# Patient Record
Sex: MALE | Race: WHITE | Employment: OTHER | URBAN - METROPOLITAN AREA
[De-identification: names, ages, dates, MRNs, and addresses within clinical notes are randomized per-mention and may not be internally consistent; named-entity substitution may affect disease eponyms.]

---

## 2017-03-03 ENCOUNTER — TELEPHONE (OUTPATIENT)
Dept: SURGERY | Facility: CLINIC | Age: 52
End: 2017-03-03

## 2017-03-22 PROBLEM — Z79.52 LONG TERM (CURRENT) USE OF SYSTEMIC STEROIDS: Status: ACTIVE | Noted: 2017-03-22

## 2017-03-22 PROBLEM — Z82.49 FAMILY HISTORY OF CORONARY ARTERY DISEASE IN FATHER: Status: ACTIVE | Noted: 2017-03-22

## 2017-03-22 PROBLEM — E55.9 VITAMIN D INSUFFICIENCY: Status: ACTIVE | Noted: 2017-03-22

## 2017-04-11 ENCOUNTER — TELEPHONE (OUTPATIENT)
Dept: SURGERY | Facility: CLINIC | Age: 52
End: 2017-04-11

## 2017-04-13 ENCOUNTER — OFFICE VISIT (OUTPATIENT)
Dept: SURGERY | Facility: CLINIC | Age: 52
End: 2017-04-13

## 2017-04-13 VITALS
HEART RATE: 84 BPM | HEIGHT: 68 IN | SYSTOLIC BLOOD PRESSURE: 144 MMHG | WEIGHT: 165 LBS | DIASTOLIC BLOOD PRESSURE: 100 MMHG | RESPIRATION RATE: 16 BRPM | BODY MASS INDEX: 25.01 KG/M2

## 2017-04-13 DIAGNOSIS — M48.02 CERVICAL SPINAL STENOSIS: ICD-10-CM

## 2017-04-13 DIAGNOSIS — M47.812 SPONDYLOSIS OF CERVICAL REGION WITHOUT MYELOPATHY OR RADICULOPATHY: Primary | ICD-10-CM

## 2017-04-13 PROCEDURE — 99203 OFFICE O/P NEW LOW 30 MIN: CPT | Performed by: PHYSICIAN ASSISTANT

## 2017-04-13 NOTE — PATIENT INSTRUCTIONS
Refill policies:    • Allow 2 business days for refills; controlled substances may take longer.   • Contact your pharmacy at least 5 days prior to running out of medication and have them send an electronic request or submit request through the “request re insurance carrier to obtain pre-certification or prior authorization. Unfortunately, [employer]UMA has seen an increase in denial of payment even though the procedure/test has been pre-certified.   You are strongly encouraged to contact your insurance carrier to v

## 2017-04-13 NOTE — H&P
[facility] Neurosurgery Consultation      HISTORY OF PRESENT ILLNESS:Max Justice is a 54year old male for cervical consultation. He denies any specific neck pain.   He complains of tightness over the back of the neck is been going on for couple of years (VITAMIN D3) 1000 units Oral Cap Take 1 tablet by mouth daily. Disp: 30 capsule Rfl: 11     No current facility-administered medications on file prior to visit. REVIEW OF SYSTEMS:  A 10-point system was reviewed.   Pertinent positives and negatives are n 5–6, 6–7  2. Cervical stenosis  3. L5 pars defect bilaterally asymptomatic    PLAN:  1. Home exercises were reviewed  2. X-rays and MRI of the cervical spine  3. Follow-up in 6 weeks    Dr Rosetta Cranker evaluated the patient and is in agreement.         Pallavi Mcdonnell

## 2017-04-14 ENCOUNTER — TELEPHONE (OUTPATIENT)
Dept: NEUROLOGY | Facility: CLINIC | Age: 52
End: 2017-04-14

## 2017-04-14 NOTE — TELEPHONE ENCOUNTER
Authorization #[id number] MRI cervical at [facility] exp 5-13-17    Called patient and left detailed message including centralized scheduling phone #

## 2017-04-27 ENCOUNTER — TELEPHONE (OUTPATIENT)
Dept: SURGERY | Facility: CLINIC | Age: 52
End: 2017-04-27

## 2017-04-27 NOTE — TELEPHONE ENCOUNTER
Left detailed message on personalized VM . inform him he should check with his insurance to find out where they recommend he go. We do not have listings of pricing for MRIs.   He is welcome to call different locations for pricing, as long as they are accept

## 2017-04-27 NOTE — TELEPHONE ENCOUNTER
Spoke to pt informed him of message below and asked if he gets imaging elsewhere for him to bring disc at next appointment. Informed him he could also get imaging done at out pt labs.

## 2017-05-02 ENCOUNTER — TELEPHONE (OUTPATIENT)
Dept: SURGERY | Facility: CLINIC | Age: 52
End: 2017-05-02

## 2017-05-03 ENCOUNTER — TELEPHONE (OUTPATIENT)
Dept: SURGERY | Facility: CLINIC | Age: 52
End: 2017-05-03

## 2017-05-03 RX ORDER — DIAZEPAM 10 MG/1
10 TABLET ORAL ONCE
Qty: 1 TABLET | Refills: 0 | Status: SHIPPED | OUTPATIENT
Start: 2017-05-03 | End: 2017-05-03

## 2017-05-03 NOTE — TELEPHONE ENCOUNTER
Valium 10 mg was ordered prior to his MRI for tomorrow.   Please call in to his pharmacy and let patient know thank you

## 2017-05-05 ENCOUNTER — TELEPHONE (OUTPATIENT)
Dept: SURGERY | Facility: CLINIC | Age: 52
End: 2017-05-05

## 2017-05-05 NOTE — TELEPHONE ENCOUNTER
Per Gia Welsh to release report to the patient  He would still like patient to drop off CD for review  Front staff informed.

## 2017-05-25 ENCOUNTER — OFFICE VISIT (OUTPATIENT)
Dept: SURGERY | Facility: CLINIC | Age: 52
End: 2017-05-25

## 2017-05-25 VITALS
BODY MASS INDEX: 25.46 KG/M2 | WEIGHT: 168 LBS | HEIGHT: 68 IN | HEART RATE: 72 BPM | SYSTOLIC BLOOD PRESSURE: 132 MMHG | DIASTOLIC BLOOD PRESSURE: 80 MMHG | RESPIRATION RATE: 12 BRPM

## 2017-05-25 DIAGNOSIS — M54.2 CERVICALGIA: ICD-10-CM

## 2017-05-25 DIAGNOSIS — M47.812 SPONDYLOSIS OF CERVICAL REGION WITHOUT MYELOPATHY OR RADICULOPATHY: Primary | ICD-10-CM

## 2017-05-25 PROCEDURE — 99214 OFFICE O/P EST MOD 30 MIN: CPT | Performed by: PHYSICIAN ASSISTANT

## 2017-05-25 RX ORDER — HYDROCODONE BITARTRATE AND ACETAMINOPHEN 5; 325 MG/1; MG/1
TABLET ORAL
Refills: 0 | COMMUNITY
Start: 2017-04-27 | End: 2019-10-11

## 2017-05-25 NOTE — PROGRESS NOTES
Neurosurgery Clinic Visit  2017    Nunu Sabianism PCP:  Annabelle Rogers MD    1965 MRN [id number]       CC: Neck Stiffness    HPI:    No change in symptoms since the last visit.   He has decreased his prednisone from 15 mg to 10 mg qd by his Rhe about 0.6 oz of alcohol per week. He reports that he does not use illicit drugs.     ALLERGIES:  No Known Allergies    MEDICATIONS:    Current Outpatient Prescriptions on File Prior to Visit:  predniSONE 5 MG Oral Tab  TAKE ONE TABLET BY MOUTH THREE TIMES D           2+        2+         2+        2+              IMAGING:  X-rays of cervical spine from 2015 show spondylosis at C3-4-5-6-7    MRI of the cervical spine from 10/8/13 shows loss of cervical lordosis.  Spondylosis at C3-4, 4–5, 5–6, with C4–5–6 sten

## 2017-05-25 NOTE — PATIENT INSTRUCTIONS
Refill policies:    • Allow 2-3 business days for refills; controlled substances may take longer.   • Contact your pharmacy at least 5 days prior to running out of medication and have them send an electronic request or submit request through the [facility] have a procedure or additional testing performed. [facility]) will contact your insurance carrier to obtain pre-certification or prior authorization.     Unfortunately, FATUMA has seen an increase in denial of payment even though the p

## 2017-05-30 ENCOUNTER — TELEPHONE (OUTPATIENT)
Dept: SURGERY | Facility: CLINIC | Age: 52
End: 2017-05-30

## 2017-05-31 ENCOUNTER — TELEPHONE (OUTPATIENT)
Dept: SURGERY | Facility: CLINIC | Age: 52
End: 2017-05-31

## 2017-07-26 PROBLEM — M50.30 DDD (DEGENERATIVE DISC DISEASE), CERVICAL: Status: ACTIVE | Noted: 2017-07-26

## 2017-07-26 PROBLEM — M54.12 CERVICAL RADICULITIS: Status: ACTIVE | Noted: 2017-07-26

## 2019-10-30 ENCOUNTER — OFFICE VISIT (OUTPATIENT)
Dept: SURGERY | Facility: CLINIC | Age: 54
End: 2019-10-30
Payer: COMMERCIAL

## 2019-10-30 VITALS — DIASTOLIC BLOOD PRESSURE: 82 MMHG | SYSTOLIC BLOOD PRESSURE: 136 MMHG | HEART RATE: 80 BPM
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
## 2019-10-30 DIAGNOSIS — M47.812 CERVICAL SPONDYLOSIS: Primary | ICD-10-CM

## 2019-10-30 DIAGNOSIS — M54.2 CERVICALGIA: ICD-10-CM

## 2019-10-30 PROCEDURE — 99213 OFFICE O/P EST LOW 20 MIN: CPT | Performed by: PHYSICIAN ASSISTANT

## 2019-10-30 NOTE — PROGRESS NOTES
Pt is here for follow up. Pt is an established Pt with Dr Marybeth Kerns. Pt was last seen 5/25/17     Pt states that he has been taking prednisone and doing well.  Pt states that 2 weeks ago he was playing floor hockey he feel on the right side and 2 days after sta

## 2019-10-30 NOTE — PROGRESS NOTES
Patient: [address] [place] [address] Record Number: [id number]  Referring Physician: No ref. provider found  PCP: Nuha García MD      HISTORY OF CHIEF COMPLAINT:    Roland Neves is a 48year old male, who presents for f/u of neck pain.  He was last seen Socioeconomic History      Marital status:       Spouse name: Not on file      Number of children: 2      Years of education: Not on file      Highest education level: Not on file    Occupational History      Occupation: Small business owner    [employer] moderate right and small left uncinate spurs and normal facets resulting in severe  right osseous foraminal stenosis. .  C5-6: Diffuse disc bulge with posterior disc osteophyte complex, moderate bilateral uncinate spurs  and normal facets results in moderat his stated age. blood pressure is 136/82 and his pulse is 80. The patient is well developed, well nourished, normal body habitus, well muscled. Inspection: No acute distress.  Patient displays non-antalgic gait, and is able to normal heel walk, indicates understanding of these issues and agrees to the plan. Thank you very much.    Respectfully yours,    Pastor BISHOP

## 2019-11-08 RX ORDER — DIAZEPAM 5 MG/1
TABLET ORAL
Qty: 2 TABLET | Refills: 0 | Status: ON HOLD | OUTPATIENT
Start: 2019-11-08 | End: 2020-07-07

## 2019-11-08 NOTE — TELEPHONE ENCOUNTER
Spoke with patient and informed him that the closed MRI would provide better imaging. Patient understood and stated he will complete the closed MRI.  Patient is claustrophobic and would like to have oral medication on hand to try and make it through the MRI

## 2019-11-08 NOTE — TELEPHONE ENCOUNTER
Pt asking to have MRI ordered done as open MRI, Scheduling would like provider approval, please call back

## 2019-11-11 ENCOUNTER — TELEPHONE (OUTPATIENT)
Dept: SURGERY | Facility: CLINIC | Age: 54
End: 2019-11-11

## 2019-11-21 ENCOUNTER — OFFICE VISIT (OUTPATIENT)
Dept: SURGERY | Facility: CLINIC | Age: 54
End: 2019-11-21
Payer: COMMERCIAL

## 2019-11-21 VITALS — DIASTOLIC BLOOD PRESSURE: 84 MMHG | SYSTOLIC BLOOD PRESSURE: 136 MMHG | HEART RATE: 82 BPM

## 2019-11-21 DIAGNOSIS — M62.89 MUSCLE TIGHTNESS: ICD-10-CM

## 2019-11-21 DIAGNOSIS — M54.2 CERVICALGIA: Primary | ICD-10-CM

## 2019-11-21 DIAGNOSIS — M47.812 FACET ARTHRITIS OF CERVICAL REGION: ICD-10-CM

## 2019-11-21 DIAGNOSIS — M50.30 DDD (DEGENERATIVE DISC DISEASE), CERVICAL: ICD-10-CM

## 2019-11-21 DIAGNOSIS — M62.838 MUSCLE SPASMS OF NECK: ICD-10-CM

## 2019-11-21 DIAGNOSIS — M48.02 FORAMINAL STENOSIS OF CERVICAL REGION: ICD-10-CM

## 2019-11-21 PROCEDURE — 99213 OFFICE O/P EST LOW 20 MIN: CPT | Performed by: PHYSICIAN ASSISTANT

## 2019-11-21 RX ORDER — CYCLOBENZAPRINE HCL 10 MG
10 TABLET ORAL 3 TIMES DAILY PRN
Qty: 60 TABLET | Refills: 0 | Status: ON HOLD | OUTPATIENT
Start: 2019-11-21 | End: 2020-07-07

## 2019-11-21 RX ORDER — PREDNISONE 10 MG/1
10 TABLET ORAL DAILY
COMMUNITY
End: 2021-05-06

## 2019-11-21 NOTE — PROGRESS NOTES
Pt is here for follow up for discussion for additional treatment,  Pt was last seen in the office 10-30-19       MRI of the cervical: Obtained    Pt states that yesterday he noticed swelling the trap in the left arm, Pt states that he has issues with diffi

## 2019-11-21 NOTE — PROGRESS NOTES
Patient: Jennifer Giron  Medical Record Number: [id number]  YOB: 1965  PCP: Jordan Vizcaino MD    Reason for visit: Cervical follow up, imaging review    HISTORY OF CHIEF COMPLAINT:    Jennifer Giron is a very pleasant 48year old male pres Cervical spinal stenosis     Followed by Dr. Nae Emerson at [facility]   • PONV (postoperative nausea and vomiting)       Past Surgical History:   Procedure Laterality Date   • CARDIAC CALCIUM SCORE  4/7/17    zero   • COLONOSCOPY  5/15    tics; repeat 10 yrs   • COLON apparent distress. Sitting comfortably in the examination chair. HEENT: Normocephalic, atraumatic. RESPIRATORY RATE: Easy and Even   MUSCULOSKELETAL: Reports pain with left shoulder external rotation.    SKIN: Warm and dry  NEURO: Awake, alert and orient appears nearly stable 2/20/2017 imaging. Foraminal narrowing right C 4–5 mildly progressed. Cervical straightening, loss of lordosis. Minimal kyphosis. Spinal stenosis noted   - Ordered today:    - None  3. Activity:    - Physical therapy ordered  4.  R

## 2019-11-22 ENCOUNTER — OFFICE VISIT (OUTPATIENT)
Dept: PHYSICAL MEDICINE AND REHAB | Facility: CLINIC | Age: 54
End: 2019-11-22
Payer: COMMERCIAL

## 2019-11-22 VITALS
DIASTOLIC BLOOD PRESSURE: 80 MMHG | BODY MASS INDEX: 25.01 KG/M2 | HEART RATE: 97 BPM | OXYGEN SATURATION: 98 % | SYSTOLIC BLOOD PRESSURE: 140 MMHG | HEIGHT: 68 IN | WEIGHT: 165 LBS

## 2019-11-22 DIAGNOSIS — M79.18 CERVICAL MYOFASCIAL PAIN SYNDROME: Primary | ICD-10-CM

## 2019-11-22 PROCEDURE — 99244 OFF/OP CNSLTJ NEW/EST MOD 40: CPT | Performed by: PHYSICAL MEDICINE & REHABILITATION

## 2019-11-22 NOTE — H&P
[address] [place] [address]    History and Physical    Alexa Starkey Patient Status:  No patient class for patient encounter    1965 MRN [id number]   Location [address] [place] [address], [address] [place] [address] Attending No att. (postoperative nausea and vomiting)      Past Surgical History:   Procedure Laterality Date   • CARDIAC CALCIUM SCORE  4/7/17    zero   • COLONOSCOPY  5/15    tics; repeat 10 yrs   • COLONOSCOPY, POSSIBLE BIOPSY, POSSIBLE POLYPECTOMY 59434 N/A 5/21/2015 muscle bulk in the bilateral upper extremities. 5/5 strength in shoulder abduction, elbow flexion, elbow extension, wrist extension, finger flexion, ADM bilaterally.     Sensation: intact to LT in C5-T1 dermatomes bilaterally    Reflexes: 2/4 biceps, brachi not improve.     Hoyt Councilman, DO  11/22/2019

## 2019-11-22 NOTE — PROGRESS NOTES
PHYSICAL EXAM:   Physical Exam   Constitutional:           Patient presents in office today with reported pain in neck     Current pain level reported = 8/10    Location of Pain:  Neck     Date Pain Began: 2013          Work Related:   No        Receivin

## 2019-11-24 ENCOUNTER — HOSPITAL ENCOUNTER (EMERGENCY)
Facility: HOSPITAL | Age: 54
Discharge: HOME | End: 2019-11-24
Attending: EMERGENCY MEDICINE
Payer: COMMERCIAL

## 2019-11-24 VITALS
DIASTOLIC BLOOD PRESSURE: 108 MMHG | OXYGEN SATURATION: 98 % | HEART RATE: 94 BPM | TEMPERATURE: 99 F | HEIGHT: 68 IN | BODY MASS INDEX: 25.01 KG/M2 | RESPIRATION RATE: 18 BRPM | SYSTOLIC BLOOD PRESSURE: 153 MMHG | WEIGHT: 165 LBS

## 2019-11-24 DIAGNOSIS — N34.2 URETHRITIS: Primary | ICD-10-CM

## 2019-11-24 PROCEDURE — 87591 N.GONORRHOEAE DNA AMP PROB: CPT | Performed by: EMERGENCY MEDICINE

## 2019-11-24 PROCEDURE — 99283 EMERGENCY DEPT VISIT LOW MDM: CPT

## 2019-11-24 PROCEDURE — 87491 CHLMYD TRACH DNA AMP PROBE: CPT | Performed by: EMERGENCY MEDICINE

## 2019-11-25 NOTE — ED PROVIDER NOTES
Patient Seen in: [facility] Emergency Department      History   Patient presents with:  Eval-G (genital)    Stated Complaint: EVAL G     HPI    Patient is a 72-year-old male comes to ED for evaluation.   Patient states for the last 3 days or so he is (*)     All other components within normal limits   CHLAMYDIA/GONOCOCCUS, NANCY                  MDM     Patient is a 28-year-old male complains of some mild pain to the tip of his penis. No evidence for urinary tract infection. Urine GC was sent.   No evid

## 2019-11-26 ENCOUNTER — MED REC SCAN ONLY (OUTPATIENT)
Dept: PAIN CLINIC | Facility: CLINIC | Age: 54
End: 2019-11-26

## 2019-11-27 ENCOUNTER — TELEPHONE (OUTPATIENT)
Dept: SURGERY | Facility: CLINIC | Age: 54
End: 2019-11-27

## 2019-12-06 ENCOUNTER — OFFICE VISIT (OUTPATIENT)
Dept: FAMILY MEDICINE CLINIC | Facility: CLINIC | Age: 54
End: 2019-12-06
Payer: COMMERCIAL

## 2019-12-06 VITALS
OXYGEN SATURATION: 99 % | DIASTOLIC BLOOD PRESSURE: 100 MMHG | HEART RATE: 95 BPM | SYSTOLIC BLOOD PRESSURE: 140 MMHG | RESPIRATION RATE: 16 BRPM | HEIGHT: 68 IN | BODY MASS INDEX: 25.52 KG/M2 | TEMPERATURE: 98 F | WEIGHT: 168.38 LBS

## 2019-12-06 DIAGNOSIS — R42 VERTIGO: Primary | ICD-10-CM

## 2019-12-06 PROCEDURE — 99213 OFFICE O/P EST LOW 20 MIN: CPT | Performed by: FAMILY MEDICINE

## 2019-12-06 RX ORDER — MECLIZINE HYDROCHLORIDE 25 MG/1
25 TABLET ORAL 3 TIMES DAILY PRN
Qty: 60 TABLET | Refills: 0 | Status: ON HOLD | OUTPATIENT
Start: 2019-12-06 | End: 2020-07-07

## 2019-12-07 NOTE — PROGRESS NOTES
CHIEF COMPLAINT:     Patient presents with:  Dizziness: dizzy, x 2 days   :      HPI:     Anna Marie is a 48year old male presents with complaints of dizziness. Patient has had symptoms for 2 days. Patient has symptoms like this before.  Pt has hx o Smoking status: Never Smoker      Smokeless tobacco: Never Used    Alcohol use:  Yes      Alcohol/week: 1.0 standard drinks      Types: 1 Standard drinks or equivalent per week      Comment: 1 glass of wine/weekly/Special Events    Drug use: No       REV clubbing or edema  LYMPH: No cervical or supraclavicular lymphadenopathy. PSYCH:  Speech, mood and affect are appropriate.      ASSESSMENT AND PLAN:     Vertigo  (primary encounter diagnosis)    No orders of the defined types were placed in this encounter

## 2019-12-07 NOTE — PATIENT INSTRUCTIONS
Take meclizine-- 1 tab every 8 hours as needed. Rest, increase fluids and avoid inverted head movements as much as possible.    Contact your chiropractor tomorrow to discuss your symptoms to make sure he doesn't have any other concerns that need to be add

## 2019-12-11 ENCOUNTER — TELEPHONE (OUTPATIENT)
Dept: PHYSICAL THERAPY | Facility: HOSPITAL | Age: 54
End: 2019-12-11

## 2019-12-17 ENCOUNTER — HOSPITAL ENCOUNTER (OUTPATIENT)
Dept: PHYSICAL THERAPY | Facility: HOSPITAL | Age: 54
Setting detail: THERAPIES SERIES
Discharge: HOME | End: 2019-12-17
Attending: PHYSICAL MEDICINE & REHABILITATION
Payer: COMMERCIAL

## 2019-12-17 DIAGNOSIS — M79.18 CERVICAL MYOFASCIAL PAIN SYNDROME: ICD-10-CM

## 2019-12-17 PROCEDURE — 97110 THERAPEUTIC EXERCISES: CPT

## 2019-12-17 PROCEDURE — 97161 PT EVAL LOW COMPLEX 20 MIN: CPT

## 2019-12-18 NOTE — PROGRESS NOTES
SPINE EVALUATION:   Referring Physician: Dr. Nitin Marley  Diagnosis: cervical myofascial pain syndrome     Date of Service: 12/17/2019     PATIENT SUMMARY   Fausto Carey is a 48year old RHD male who presents to therapy today with complaints of bilateral Cervical spinal stenosis     Followed by Dr. Donny Adams at [facility]   • PONV (postoperative nausea and vomiting)      Pt denies diplopia, dysarthria, dysphasia, dizziness, drop attacks, bowel/bladder changes, saddle anesthesia, and GREG LE N/T.    ASSESSMENT  Silke Zavala limited; L moderately limited  Levator Scap: R moderately limited; L moderately limited  Pec Major: R moderately limited; L moderately limited  Scalenes: R moderately limited, L moderately limited     Special tests:   Cervical distraction positive, emerita the need for these services furnished under this plan of treatment and while under my care.     X___________________________________________________ Date____________________    Certification From: 82/05/0587  To:3/17/2020

## 2019-12-20 ENCOUNTER — OFFICE VISIT (OUTPATIENT)
Dept: PHYSICAL MEDICINE AND REHAB | Facility: CLINIC | Age: 54
End: 2019-12-20
Payer: COMMERCIAL

## 2019-12-20 VITALS
DIASTOLIC BLOOD PRESSURE: 80 MMHG | HEART RATE: 87 BPM | OXYGEN SATURATION: 98 % | SYSTOLIC BLOOD PRESSURE: 120 MMHG | WEIGHT: 165 LBS | BODY MASS INDEX: 25.01 KG/M2 | HEIGHT: 68 IN

## 2019-12-20 DIAGNOSIS — M79.18 CERVICAL MYOFASCIAL PAIN SYNDROME: Primary | ICD-10-CM

## 2019-12-20 PROCEDURE — 99214 OFFICE O/P EST MOD 30 MIN: CPT | Performed by: PHYSICAL MEDICINE & REHABILITATION

## 2019-12-20 NOTE — PROGRESS NOTES
HPI:    Patient ID: Andreea Hernández is a 48year old male. 48year old male presents for follow up. Symptoms improved since the last time I saw him, but still present.  Was able to do one session with physical therapy, and has more therapy scheduled sta Neurological:   Strength testin/5  bilaterally    Sensation: intact to LT in C5-T1 dermatomes bilaterally    Reflexes: 2/4 biceps, brachioradialis, triceps bilaterally    Correia test: normal bilaterally   Skin: Skin is warm and dry. No rash noted.

## 2019-12-20 NOTE — PATIENT INSTRUCTIONS
If you are not improving call the office and we will get you set up for trigger point injections. In the meantime continue your exercises and I will hear from your therapist when the next 4 sessions are through.      Refill policies:    • Allow 2-3 business MONDAY-FRIDAY    Scheduling Tests: If your physician has ordered radiology tests such as MRI or CT scans, do not schedule the test until this office has notified you that the test has been approved by your insurer.   Depending on your insurance carrier,  for this paper. • Failure to follow above steps may result in the delay of form completion.

## 2019-12-20 NOTE — PROGRESS NOTES
Patient presents in office today with reported spasms in the neck to the shoulders tightness     Physical therapy one session     Chiropractor helps for a little more then 50% for a day

## 2019-12-27 ENCOUNTER — HOSPITAL ENCOUNTER (OUTPATIENT)
Dept: PHYSICAL THERAPY | Facility: HOSPITAL | Age: 54
Setting detail: THERAPIES SERIES
Discharge: HOME | End: 2019-12-27
Attending: PHYSICAL MEDICINE & REHABILITATION
Payer: COMMERCIAL

## 2019-12-27 DIAGNOSIS — M79.18 CERVICAL MYOFASCIAL PAIN SYNDROME: ICD-10-CM

## 2019-12-27 PROCEDURE — 97110 THERAPEUTIC EXERCISES: CPT

## 2019-12-27 PROCEDURE — 97140 MANUAL THERAPY 1/> REGIONS: CPT

## 2019-12-28 NOTE — PROGRESS NOTES
Dx: cervical myofascial pain syndrome             Authorized # of Visits:  8 per POC         Next MD visit: none scheduled  Fall Risk: standard         Precautions: n/a             Subjective: Patient reports that he has been doing his HEP, which is gettin

## 2019-12-30 ENCOUNTER — HOSPITAL ENCOUNTER (OUTPATIENT)
Dept: PHYSICAL THERAPY | Facility: HOSPITAL | Age: 54
Setting detail: THERAPIES SERIES
Discharge: HOME | End: 2019-12-30
Attending: PHYSICAL MEDICINE & REHABILITATION
Payer: COMMERCIAL

## 2019-12-30 DIAGNOSIS — M79.18 CERVICAL MYOFASCIAL PAIN SYNDROME: ICD-10-CM

## 2019-12-30 PROCEDURE — 97110 THERAPEUTIC EXERCISES: CPT

## 2019-12-30 PROCEDURE — 97140 MANUAL THERAPY 1/> REGIONS: CPT

## 2019-12-30 NOTE — PROGRESS NOTES
Dx: cervical myofascial pain syndrome             Authorized # of Visits:  8 per POC         Next MD visit: none scheduled  Fall Risk: standard         Precautions: n/a             Subjective: Patient reports that HEP is getting easier.  He did snag mobiliz

## 2020-01-03 ENCOUNTER — APPOINTMENT (OUTPATIENT)
Dept: PHYSICAL THERAPY | Facility: HOSPITAL | Age: 55
End: 2020-01-03
Attending: PHYSICAL MEDICINE & REHABILITATION
Payer: COMMERCIAL

## 2020-02-27 ENCOUNTER — TELEPHONE (OUTPATIENT)
Dept: SURGERY | Facility: CLINIC | Age: 55
End: 2020-02-27

## 2020-02-27 NOTE — TELEPHONE ENCOUNTER
Pt calling stating CT Cervical states \"severe disk space collapse C2-C7 greates @ C5 & C6\", however MRI Cervical does not have this verbage; pt asking if MRI may have missed something, please call back

## 2020-03-09 ENCOUNTER — TELEPHONE (OUTPATIENT)
Dept: NEUROLOGY | Facility: CLINIC | Age: 55
End: 2020-03-09

## 2020-03-09 ENCOUNTER — TELEPHONE (OUTPATIENT)
Dept: SURGERY | Facility: CLINIC | Age: 55
End: 2020-03-09

## 2020-03-09 DIAGNOSIS — M79.18 CERVICAL MYOFASCIAL PAIN SYNDROME: Primary | ICD-10-CM

## 2020-03-09 NOTE — TELEPHONE ENCOUNTER
Patient called asking for Trigger point injections to be done in [place]. Dr. Anne Sandhoff please order trigger point injection for [place]. Thank you

## 2020-03-09 NOTE — TELEPHONE ENCOUNTER
Spoke to patient waiting for Dr. Malgorzata Cadena to order Trigger point injection and then we will schedule injection in [place] [address] office.

## 2020-03-11 ENCOUNTER — TELEPHONE (OUTPATIENT)
Dept: NEUROLOGY | Facility: CLINIC | Age: 55
End: 2020-03-11

## 2020-03-11 NOTE — TELEPHONE ENCOUNTER
[employer] for authorization of approval for Bilateral cervical and upper trapezial trigger point injections  cpt codes 41711, 22905. Authorization is not required. Transaction ID: [id number]. Will  Inform Nursing.

## 2020-03-16 ENCOUNTER — OFFICE VISIT (OUTPATIENT)
Dept: NEUROLOGY | Facility: CLINIC | Age: 55
End: 2020-03-16
Payer: COMMERCIAL

## 2020-03-16 ENCOUNTER — MED REC SCAN ONLY (OUTPATIENT)
Dept: NEUROLOGY | Facility: CLINIC | Age: 55
End: 2020-03-16

## 2020-03-16 VITALS — BODY MASS INDEX: 25.01 KG/M2 | RESPIRATION RATE: 16 BRPM | HEIGHT: 68 IN | WEIGHT: 165 LBS

## 2020-03-16 DIAGNOSIS — M79.18 CERVICAL MYOFASCIAL PAIN SYNDROME: Primary | ICD-10-CM

## 2020-03-16 PROCEDURE — 20553 NJX 1/MLT TRIGGER POINTS 3/>: CPT | Performed by: PHYSICAL MEDICINE & REHABILITATION

## 2020-03-16 RX ORDER — LIDOCAINE HYDROCHLORIDE 10 MG/ML
5 INJECTION, SOLUTION INFILTRATION; PERINEURAL ONCE
Status: COMPLETED | OUTPATIENT
Start: 2020-03-16 | End: 2020-03-16

## 2020-03-16 NOTE — PROCEDURES
Procedure note: Trigger point injections  Procedure Diagnosis: Myofascial pain    Summary of Procedure:   Risks and benefits of the procedure were discussed with the patient and consent was obtained.  Trigger points were palpated and marked along the bilate

## 2020-06-03 PROBLEM — M47.899 HLA-B27 SPONDYLOARTHROPATHY: Status: ACTIVE | Noted: 2020-06-03

## 2020-06-03 PROBLEM — R03.0 ELEVATED BLOOD PRESSURE READING WITHOUT DIAGNOSIS OF HYPERTENSION: Status: ACTIVE | Noted: 2020-06-03

## 2020-07-06 ENCOUNTER — HOSPITAL ENCOUNTER (INPATIENT)
Facility: HOSPITAL | Age: 55
LOS: 1 days | Discharge: HOME | DRG: 392 | End: 2020-07-08
Attending: EMERGENCY MEDICINE | Admitting: INTERNAL MEDICINE
Payer: COMMERCIAL

## 2020-07-06 ENCOUNTER — APPOINTMENT (OUTPATIENT)
Dept: CT IMAGING | Facility: HOSPITAL | Age: 55
DRG: 392 | End: 2020-07-06
Attending: EMERGENCY MEDICINE
Payer: COMMERCIAL

## 2020-07-06 ENCOUNTER — APPOINTMENT (OUTPATIENT)
Dept: GENERAL RADIOLOGY | Facility: HOSPITAL | Age: 55
DRG: 392 | End: 2020-07-06
Attending: EMERGENCY MEDICINE
Payer: COMMERCIAL

## 2020-07-06 DIAGNOSIS — R50.9 FEVER, UNSPECIFIED FEVER CAUSE: Primary | ICD-10-CM

## 2020-07-06 DIAGNOSIS — K57.92 ACUTE DIVERTICULITIS: ICD-10-CM

## 2020-07-06 PROBLEM — E87.6 HYPOKALEMIA: Status: ACTIVE | Noted: 2020-07-06

## 2020-07-06 PROBLEM — R73.9 HYPERGLYCEMIA: Status: ACTIVE | Noted: 2020-07-06

## 2020-07-06 LAB
ALBUMIN SERPL-MCNC: 3.3 G/DL (ref 3.4–5)
ALBUMIN/GLOB SERPL: 1 {RATIO} (ref 1–2)
ALP LIVER SERPL-CCNC: 60 U/L (ref 45–117)
ALT SERPL-CCNC: 26 U/L (ref 16–61)
ANION GAP SERPL CALC-SCNC: 2 MMOL/L (ref 0–18)
AST SERPL-CCNC: 16 U/L (ref 15–37)
BASOPHILS # BLD AUTO: 0.03 X10(3) UL (ref 0–0.2)
BASOPHILS NFR BLD AUTO: 0.4 %
BILIRUB SERPL-MCNC: 0.7 MG/DL (ref 0.1–2)
BILIRUB UR QL STRIP.AUTO: NEGATIVE
BUN BLD-MCNC: 19 MG/DL (ref 7–18)
BUN/CREAT SERPL: 15.1 (ref 10–20)
CALCIUM BLD-MCNC: 8.5 MG/DL (ref 8.5–10.1)
CHLORIDE SERPL-SCNC: 106 MMOL/L (ref 98–112)
CK SERPL-CCNC: 52 U/L (ref 39–308)
CLARITY UR REFRACT.AUTO: CLEAR
CO2 SERPL-SCNC: 30 MMOL/L (ref 21–32)
CREAT BLD-MCNC: 1.26 MG/DL (ref 0.7–1.3)
DEPRECATED RDW RBC AUTO: 41.2 FL (ref 35.1–46.3)
EOSINOPHIL # BLD AUTO: 0 X10(3) UL (ref 0–0.7)
EOSINOPHIL NFR BLD AUTO: 0 %
ERYTHROCYTE [DISTWIDTH] IN BLOOD BY AUTOMATED COUNT: 11.9 % (ref 11–15)
GLOBULIN PLAS-MCNC: 3.2 G/DL (ref 2.8–4.4)
GLUCOSE BLD-MCNC: 107 MG/DL (ref 70–99)
GLUCOSE UR STRIP.AUTO-MCNC: NEGATIVE MG/DL
HCT VFR BLD AUTO: 42.6 % (ref 39–53)
HGB BLD-MCNC: 13.8 G/DL (ref 13–17.5)
IMM GRANULOCYTES # BLD AUTO: 0.07 X10(3) UL (ref 0–1)
IMM GRANULOCYTES NFR BLD: 1 %
KETONES UR STRIP.AUTO-MCNC: 20 MG/DL
LEUKOCYTE ESTERASE UR QL STRIP.AUTO: NEGATIVE
LYMPHOCYTES # BLD AUTO: 0.69 X10(3) UL (ref 1–4)
LYMPHOCYTES NFR BLD AUTO: 10 %
M PROTEIN MFR SERPL ELPH: 6.5 G/DL (ref 6.4–8.2)
MCH RBC QN AUTO: 30.7 PG (ref 26–34)
MCHC RBC AUTO-ENTMCNC: 32.4 G/DL (ref 31–37)
MCV RBC AUTO: 94.7 FL (ref 80–100)
MONOCYTES # BLD AUTO: 0.5 X10(3) UL (ref 0.1–1)
MONOCYTES NFR BLD AUTO: 7.2 %
NEUTROPHILS # BLD AUTO: 5.61 X10 (3) UL (ref 1.5–7.7)
NEUTROPHILS # BLD AUTO: 5.61 X10(3) UL (ref 1.5–7.7)
NEUTROPHILS NFR BLD AUTO: 81.4 %
NITRITE UR QL STRIP.AUTO: NEGATIVE
NT-PROBNP SERPL-MCNC: 122 PG/ML (ref ?–125)
OSMOLALITY SERPL CALC.SUM OF ELEC: 289 MOSM/KG (ref 275–295)
PH UR STRIP.AUTO: 7 [PH] (ref 4.5–8)
PLATELET # BLD AUTO: 200 10(3)UL (ref 150–450)
POTASSIUM SERPL-SCNC: 3.5 MMOL/L (ref 3.5–5.1)
PROT UR STRIP.AUTO-MCNC: NEGATIVE MG/DL
RBC # BLD AUTO: 4.5 X10(6)UL (ref 4.3–5.7)
SARS-COV-2 RNA RESP QL NAA+PROBE: NOT DETECTED
SODIUM SERPL-SCNC: 138 MMOL/L (ref 136–145)
SP GR UR STRIP.AUTO: 1.03 (ref 1–1.03)
UROBILINOGEN UR STRIP.AUTO-MCNC: <2 MG/DL
WBC # BLD AUTO: 6.9 X10(3) UL (ref 4–11)

## 2020-07-06 PROCEDURE — 81001 URINALYSIS AUTO W/SCOPE: CPT | Performed by: EMERGENCY MEDICINE

## 2020-07-06 PROCEDURE — 71045 X-RAY EXAM CHEST 1 VIEW: CPT | Performed by: EMERGENCY MEDICINE

## 2020-07-06 PROCEDURE — 96365 THER/PROPH/DIAG IV INF INIT: CPT

## 2020-07-06 PROCEDURE — 82550 ASSAY OF CK (CPK): CPT | Performed by: HOSPITALIST

## 2020-07-06 PROCEDURE — 85025 COMPLETE CBC W/AUTO DIFF WBC: CPT | Performed by: EMERGENCY MEDICINE

## 2020-07-06 PROCEDURE — 87040 BLOOD CULTURE FOR BACTERIA: CPT | Performed by: EMERGENCY MEDICINE

## 2020-07-06 PROCEDURE — 36415 COLL VENOUS BLD VENIPUNCTURE: CPT

## 2020-07-06 PROCEDURE — 74177 CT ABD & PELVIS W/CONTRAST: CPT | Performed by: EMERGENCY MEDICINE

## 2020-07-06 PROCEDURE — 83880 ASSAY OF NATRIURETIC PEPTIDE: CPT | Performed by: HOSPITALIST

## 2020-07-06 PROCEDURE — 84145 PROCALCITONIN (PCT): CPT | Performed by: HOSPITALIST

## 2020-07-06 PROCEDURE — 80053 COMPREHEN METABOLIC PANEL: CPT | Performed by: EMERGENCY MEDICINE

## 2020-07-06 PROCEDURE — 96361 HYDRATE IV INFUSION ADD-ON: CPT

## 2020-07-06 PROCEDURE — 99285 EMERGENCY DEPT VISIT HI MDM: CPT

## 2020-07-06 PROCEDURE — 96375 TX/PRO/DX INJ NEW DRUG ADDON: CPT

## 2020-07-06 RX ORDER — METRONIDAZOLE 500 MG/100ML
500 INJECTION, SOLUTION INTRAVENOUS EVERY 8 HOURS
Status: DISCONTINUED | OUTPATIENT
Start: 2020-07-07 | End: 2020-07-08

## 2020-07-06 RX ORDER — ACETAMINOPHEN 325 MG/1
650 TABLET ORAL EVERY 6 HOURS PRN
Status: DISCONTINUED | OUTPATIENT
Start: 2020-07-06 | End: 2020-07-08

## 2020-07-06 RX ORDER — ENOXAPARIN SODIUM 100 MG/ML
40 INJECTION SUBCUTANEOUS DAILY
Status: DISCONTINUED | OUTPATIENT
Start: 2020-07-07 | End: 2020-07-08

## 2020-07-06 RX ORDER — ACETAMINOPHEN 500 MG
1000 TABLET ORAL ONCE
Status: COMPLETED | OUTPATIENT
Start: 2020-07-06 | End: 2020-07-06

## 2020-07-06 RX ORDER — ONDANSETRON 2 MG/ML
4 INJECTION INTRAMUSCULAR; INTRAVENOUS ONCE
Status: COMPLETED | OUTPATIENT
Start: 2020-07-06 | End: 2020-07-06

## 2020-07-06 RX ORDER — METRONIDAZOLE 500 MG/100ML
500 INJECTION, SOLUTION INTRAVENOUS ONCE
Status: COMPLETED | OUTPATIENT
Start: 2020-07-06 | End: 2020-07-07

## 2020-07-06 RX ORDER — METOCLOPRAMIDE HYDROCHLORIDE 5 MG/ML
10 INJECTION INTRAMUSCULAR; INTRAVENOUS EVERY 8 HOURS PRN
Status: DISCONTINUED | OUTPATIENT
Start: 2020-07-06 | End: 2020-07-08

## 2020-07-06 RX ORDER — ONDANSETRON 2 MG/ML
4 INJECTION INTRAMUSCULAR; INTRAVENOUS EVERY 6 HOURS PRN
Status: DISCONTINUED | OUTPATIENT
Start: 2020-07-06 | End: 2020-07-08

## 2020-07-06 RX ORDER — SODIUM CHLORIDE 9 MG/ML
INJECTION, SOLUTION INTRAVENOUS CONTINUOUS
Status: DISCONTINUED | OUTPATIENT
Start: 2020-07-06 | End: 2020-07-08

## 2020-07-06 NOTE — ED PROVIDER NOTES
Patient Seen in: [facility] Emergency Department      History   Patient presents with:  Fever  Nausea/Vomiting/Diarrhea    Stated Complaint: fever N&V - 102. 6F for ems    HPI  This is 53-year-old man denies any significant medical history, here with noted in HPI. Constitutional and vital signs reviewed. All other systems reviewed and negative except as noted above.     Physical Exam     ED Triage Vitals   BP 07/06/20 1945 (!) 149/92   Pulse 07/06/20 1945 98   Resp 07/06/20 1945 20   Temp 07/06/20 CREATININE) - Normal   RAPID SARS-COV-2 BY PCR - Normal   CBC WITH DIFFERENTIAL WITH PLATELET    Narrative: The following orders were created for panel order CBC WITH DIFFERENTIAL WITH PLATELET.   Procedure                               Abnormality ACR (American College of Radiology) NRDR ([address]) which includes the Dose Index Registry.   PATIENT STATED HISTORY:(As transcribed by Technologist)  llq abdominal pain, fever and n/v x 24 hours   CONTRAST USED:  90cc of Omnipaque 3 with fever, diffuse myalgias. On exam he has mild left lower quadrant tenderness, differential includes COVID-19 infection, pneumonia, diverticulitis, gastroenteritis. Will check basic labs give IV fluids antiemetics reevaluate.   Admission disposition: 7

## 2020-07-06 NOTE — ED INITIAL ASSESSMENT (HPI)
Patient presents via EMS for c/o fever and N&V for about 24 hours. Patient's temp for EMS was 102.6F Took Advil at about 0930 this morning. Received 4mg zofran via EMS and nausea has improved.

## 2020-07-07 LAB
ANION GAP SERPL CALC-SCNC: 6 MMOL/L (ref 0–18)
BASOPHILS # BLD AUTO: 0.04 X10(3) UL (ref 0–0.2)
BASOPHILS NFR BLD AUTO: 0.6 %
BUN BLD-MCNC: 13 MG/DL (ref 7–18)
BUN/CREAT SERPL: 13.4 (ref 10–20)
CALCIUM BLD-MCNC: 8.4 MG/DL (ref 8.5–10.1)
CHLORIDE SERPL-SCNC: 105 MMOL/L (ref 98–112)
CO2 SERPL-SCNC: 25 MMOL/L (ref 21–32)
CREAT BLD-MCNC: 0.97 MG/DL (ref 0.7–1.3)
CRP SERPL-MCNC: 7.79 MG/DL (ref ?–0.3)
D-DIMER: 1.28 UG/ML FEU (ref ?–0.53)
DEPRECATED HBV CORE AB SER IA-ACNC: 360.1 NG/ML (ref 30–530)
DEPRECATED RDW RBC AUTO: 41.8 FL (ref 35.1–46.3)
EOSINOPHIL # BLD AUTO: 0 X10(3) UL (ref 0–0.7)
EOSINOPHIL NFR BLD AUTO: 0 %
ERYTHROCYTE [DISTWIDTH] IN BLOOD BY AUTOMATED COUNT: 11.9 % (ref 11–15)
GLUCOSE BLD-MCNC: 66 MG/DL (ref 70–99)
GLUCOSE BLD-MCNC: 93 MG/DL (ref 70–99)
HCT VFR BLD AUTO: 42.6 % (ref 39–53)
HGB BLD-MCNC: 13.9 G/DL (ref 13–17.5)
IMM GRANULOCYTES # BLD AUTO: 0.08 X10(3) UL (ref 0–1)
IMM GRANULOCYTES NFR BLD: 1.2 %
LDH SERPL L TO P-CCNC: 202 U/L
LYMPHOCYTES # BLD AUTO: 0.64 X10(3) UL (ref 1–4)
LYMPHOCYTES NFR BLD AUTO: 9.7 %
MCH RBC QN AUTO: 31.3 PG (ref 26–34)
MCHC RBC AUTO-ENTMCNC: 32.6 G/DL (ref 31–37)
MCV RBC AUTO: 95.9 FL (ref 80–100)
MONOCYTES # BLD AUTO: 0.47 X10(3) UL (ref 0.1–1)
MONOCYTES NFR BLD AUTO: 7.1 %
NEUTROPHILS # BLD AUTO: 5.36 X10 (3) UL (ref 1.5–7.7)
NEUTROPHILS # BLD AUTO: 5.36 X10(3) UL (ref 1.5–7.7)
NEUTROPHILS NFR BLD AUTO: 81.4 %
OSMOLALITY SERPL CALC.SUM OF ELEC: 280 MOSM/KG (ref 275–295)
PLATELET # BLD AUTO: 186 10(3)UL (ref 150–450)
POTASSIUM SERPL-SCNC: 3.2 MMOL/L (ref 3.5–5.1)
POTASSIUM SERPL-SCNC: 4.1 MMOL/L (ref 3.5–5.1)
PROCALCITONIN SERPL-MCNC: 0.39 NG/ML (ref ?–0.16)
RBC # BLD AUTO: 4.44 X10(6)UL (ref 4.3–5.7)
SODIUM SERPL-SCNC: 136 MMOL/L (ref 136–145)
WBC # BLD AUTO: 6.6 X10(3) UL (ref 4–11)

## 2020-07-07 PROCEDURE — 85025 COMPLETE CBC W/AUTO DIFF WBC: CPT | Performed by: HOSPITALIST

## 2020-07-07 PROCEDURE — 80048 BASIC METABOLIC PNL TOTAL CA: CPT | Performed by: HOSPITALIST

## 2020-07-07 PROCEDURE — 83615 LACTATE (LD) (LDH) ENZYME: CPT | Performed by: HOSPITALIST

## 2020-07-07 PROCEDURE — 84132 ASSAY OF SERUM POTASSIUM: CPT | Performed by: INTERNAL MEDICINE

## 2020-07-07 PROCEDURE — 82962 GLUCOSE BLOOD TEST: CPT

## 2020-07-07 PROCEDURE — 86140 C-REACTIVE PROTEIN: CPT | Performed by: HOSPITALIST

## 2020-07-07 PROCEDURE — 85379 FIBRIN DEGRADATION QUANT: CPT | Performed by: HOSPITALIST

## 2020-07-07 PROCEDURE — 82728 ASSAY OF FERRITIN: CPT | Performed by: HOSPITALIST

## 2020-07-07 RX ORDER — PREDNISONE 10 MG/1
10 TABLET ORAL
Status: DISCONTINUED | OUTPATIENT
Start: 2020-07-07 | End: 2020-07-08

## 2020-07-07 RX ORDER — POTASSIUM CHLORIDE 14.9 MG/ML
20 INJECTION INTRAVENOUS ONCE
Status: COMPLETED | OUTPATIENT
Start: 2020-07-07 | End: 2020-07-07

## 2020-07-07 RX ORDER — PANTOPRAZOLE SODIUM 20 MG/1
20 TABLET, DELAYED RELEASE ORAL
Status: DISCONTINUED | OUTPATIENT
Start: 2020-07-07 | End: 2020-07-08

## 2020-07-07 RX ORDER — LEVOFLOXACIN 5 MG/ML
750 INJECTION, SOLUTION INTRAVENOUS EVERY 24 HOURS
Status: DISCONTINUED | OUTPATIENT
Start: 2020-07-07 | End: 2020-07-08

## 2020-07-07 RX ORDER — ZOLPIDEM TARTRATE 5 MG/1
2.5 TABLET ORAL NIGHTLY
Status: DISCONTINUED | OUTPATIENT
Start: 2020-07-07 | End: 2020-07-08

## 2020-07-07 NOTE — H&P
[facility] Hospitalist History and Physical      Patient presents with:  Fever  Nausea/Vomiting/Diarrhea       PCP: Giuseppe Molina MD      History of Present Illness: Patient is a 50year old male with PMH sig for arthritis with HLA-B27 spondyloarthropathy who pre Disp: , Rfl: 0  HYDROcodone-acetaminophen 5-325 MG Oral Tab, Take 1 tablet by mouth as needed. , Disp: , Rfl:   Azelastine HCl 0.1 % Nasal Solution, 2 sprays by Nasal route 2 (two) times daily. , Disp: 1 Bottle, Rfl: 0  Albuterol Sulfate  (90 Base) MC 07/07/2020     07/07/2020    K 3.2 07/07/2020     07/07/2020    CO2 25.0 07/07/2020    GLU 66 07/07/2020    CA 8.4 07/07/2020    ALB 3.3 07/06/2020    ALKPHO 60 07/06/2020    BILT 0.7 07/06/2020    TP 6.5 07/06/2020    AST 16 07/06/2020    ALT abnormal density, or significant focal lesion. BILIARY:  No visible dilatation or calcification. PANCREAS:  No lesion, fluid collection, ductal dilatation, or atrophy. SPLEEN:  No enlargement or focal lesion.   KIDNEYS:  Possible para pelvic cysts of the evaluation   - cont empiric rocephin and flagyl   - cont NS at 100 cc/hr  - f/u BCx's  - COVID PCR is pending, will keep in contact/dropolet isolation until this test is back     #HLA-B27 spondyloarthropathy   - resume prednisone     DVT prophy - lov SC  D

## 2020-07-07 NOTE — PAYOR COMM NOTE
--------------  ADMISSION REVIEW     Payor: LISA COFFEY  Subscriber #:  [id number]  Authorization Number: [id number]    Admit date: 7/7/20  Admit time: 1173     Admit Orders (From admission, onward)     Start     Ordered    07/07/20 1323  Admit to inpatient rales.   Abdominal: Soft, mild left lower quadrant tenderness, no guarding no rebound tenderness  Skin: Warm and dry  Neurological: Awake alert, speech is normal    Labs Reviewed   COMP METABOLIC PANEL (14) - Abnormal; Notable for the following components: The patient reports waking up with chills and vague abdominal discomfort, one episode of dry heaves but no vomiting. No cough, SOB, URI symptoms, or diarrhea. Pt reports having people over at the house yesterday, sister reported feeling unwell.   No know 50 yr old male with PMH sig for arthritis with HLA-B27 spondyloarthropathy who presented to the ED last night c/o fevers, myalgias, and abd pain.     #Fevers, myalgias, and abd pain  #Probable acute sigmoid diverticulitis   - suspect the source of fever RN      ondansetron HCl (ZOFRAN) injection 4 mg     Date Action Dose Route User    7/7/2020 1010 Given 4 mg Intravenous Liv Tovar RN    7/7/2020 0354 Given 4 mg Intravenous Brandi Correa RN      potassium chloride 40 mEq in sodium chloride 0.9% 250 m

## 2020-07-07 NOTE — PLAN OF CARE
Problem: Patient/Family Goals  Goal: Patient/Family Long Term Goal  Description  Patient's Long Term Goal: Discharge with adequate resources    Interventions:  - Participate in and comply with medical treatment plan  - See additional Care Plan goals for

## 2020-07-07 NOTE — CONSULTS
[facility]  Report of Consultation    Jimmikal Billingsley Patient Status:  Observation    1965 MRN [id number]   [facility] 5NW-A Attending Noé Lira MD   [facility] Day # 0 PCP Mary Martinez MD     20     Reason for Consulta Facility-Administered Medications:   •  potassium chloride 40 mEq in sodium chloride 0.9% 250 mL IVPB, 40 mEq, Intravenous, Once **FOLLOWED BY** potassium chloride IVPB premix 20 mEq, 20 mEq, Intravenous, Once  •  Pantoprazole Sodium (PROTONIX) EC tab 20 m positives and negatives per history of present illness. Physical Exam:    Blood pressure 123/76, pulse 82, temperature 99.8 °F (37.7 °C), temperature source Oral, resp. rate 16, height 68\", weight 175 lb 14.4 oz (79.8 kg), SpO2 96 %.     GENERAL: Alert CT ABDOMEN PELVIS IV CONTRAST, NO ORAL (ER)  COMPARISON:  None.   INDICATIONS:  fever, LLQ pain  TECHNIQUE:  CT scanning was performed from the dome of the diaphragm to the pubic symphysis with non-ionic intravenous contrast material. Post contrast coronal most compatible with diverticulitis. No evidence of an abscess or drainable fluid collection. Colonoscopy is suggested when the patient is able to tolerate to exclude an underlying neoplastic process.    Dictated by: Kristina Torrez MD on 7/06/2020 at 8:29 PM

## 2020-07-07 NOTE — PROGRESS NOTES
NURSING ADMISSION NOTE      Patient admitted via Cart  Oriented to room. Safety precautions initiated. Bed in low position. Call light in reach.     Admission navigator completed and pt updated on plan of care, verbalizes understanding and no further

## 2020-07-07 NOTE — PLAN OF CARE
Pt AOx4. VSS on RA. Tele, NSR. Tmax 100.9, tylenol given. Lovenox. Voids. C.o nausea, zofran given. Ambulates independently. Iv abx. Ivf. NPO w/ ice chips. Surgery on consult. Pt and wife updated on poc, wctm.       Problem: Patient/Family Goals  Goal: Papo Noel

## 2020-07-08 VITALS
WEIGHT: 175.88 LBS | RESPIRATION RATE: 20 BRPM | OXYGEN SATURATION: 97 % | HEIGHT: 68 IN | TEMPERATURE: 99 F | DIASTOLIC BLOOD PRESSURE: 88 MMHG | HEART RATE: 74 BPM | BODY MASS INDEX: 26.66 KG/M2 | SYSTOLIC BLOOD PRESSURE: 148 MMHG

## 2020-07-08 LAB
ANION GAP SERPL CALC-SCNC: 5 MMOL/L (ref 0–18)
BASOPHILS # BLD AUTO: 0.03 X10(3) UL (ref 0–0.2)
BASOPHILS NFR BLD AUTO: 0.7 %
BUN BLD-MCNC: 10 MG/DL (ref 7–18)
BUN/CREAT SERPL: 11.1 (ref 10–20)
CALCIUM BLD-MCNC: 8.4 MG/DL (ref 8.5–10.1)
CHLORIDE SERPL-SCNC: 109 MMOL/L (ref 98–112)
CO2 SERPL-SCNC: 25 MMOL/L (ref 21–32)
CREAT BLD-MCNC: 0.9 MG/DL (ref 0.7–1.3)
CRP SERPL-MCNC: 8.32 MG/DL (ref ?–0.3)
D-DIMER: 1.1 UG/ML FEU (ref ?–0.53)
DEPRECATED HBV CORE AB SER IA-ACNC: 514.6 NG/ML (ref 30–530)
DEPRECATED RDW RBC AUTO: 40.7 FL (ref 35.1–46.3)
EOSINOPHIL # BLD AUTO: 0.13 X10(3) UL (ref 0–0.7)
EOSINOPHIL NFR BLD AUTO: 2.9 %
ERYTHROCYTE [DISTWIDTH] IN BLOOD BY AUTOMATED COUNT: 11.8 % (ref 11–15)
GLUCOSE BLD-MCNC: 86 MG/DL (ref 70–99)
HCT VFR BLD AUTO: 39.4 % (ref 39–53)
HGB BLD-MCNC: 13.1 G/DL (ref 13–17.5)
IMM GRANULOCYTES # BLD AUTO: 0.03 X10(3) UL (ref 0–1)
IMM GRANULOCYTES NFR BLD: 0.7 %
LDH SERPL L TO P-CCNC: 199 U/L
LYMPHOCYTES # BLD AUTO: 1.16 X10(3) UL (ref 1–4)
LYMPHOCYTES NFR BLD AUTO: 25.7 %
MCH RBC QN AUTO: 31.1 PG (ref 26–34)
MCHC RBC AUTO-ENTMCNC: 33.2 G/DL (ref 31–37)
MCV RBC AUTO: 93.6 FL (ref 80–100)
MONOCYTES # BLD AUTO: 0.65 X10(3) UL (ref 0.1–1)
MONOCYTES NFR BLD AUTO: 14.4 %
NEUTROPHILS # BLD AUTO: 2.51 X10 (3) UL (ref 1.5–7.7)
NEUTROPHILS # BLD AUTO: 2.51 X10(3) UL (ref 1.5–7.7)
NEUTROPHILS NFR BLD AUTO: 55.6 %
OSMOLALITY SERPL CALC.SUM OF ELEC: 286 MOSM/KG (ref 275–295)
PLATELET # BLD AUTO: 177 10(3)UL (ref 150–450)
POTASSIUM SERPL-SCNC: 3.5 MMOL/L (ref 3.5–5.1)
RBC # BLD AUTO: 4.21 X10(6)UL (ref 4.3–5.7)
SARS-COV-2 RNA RESP QL NAA+PROBE: NOT DETECTED
SODIUM SERPL-SCNC: 139 MMOL/L (ref 136–145)
WBC # BLD AUTO: 4.5 X10(3) UL (ref 4–11)

## 2020-07-08 PROCEDURE — 82728 ASSAY OF FERRITIN: CPT | Performed by: HOSPITALIST

## 2020-07-08 PROCEDURE — 86140 C-REACTIVE PROTEIN: CPT | Performed by: HOSPITALIST

## 2020-07-08 PROCEDURE — 85379 FIBRIN DEGRADATION QUANT: CPT | Performed by: HOSPITALIST

## 2020-07-08 PROCEDURE — 80048 BASIC METABOLIC PNL TOTAL CA: CPT | Performed by: INTERNAL MEDICINE

## 2020-07-08 PROCEDURE — 83615 LACTATE (LD) (LDH) ENZYME: CPT | Performed by: HOSPITALIST

## 2020-07-08 PROCEDURE — 85025 COMPLETE CBC W/AUTO DIFF WBC: CPT | Performed by: HOSPITALIST

## 2020-07-08 RX ORDER — LEVOFLOXACIN 750 MG/1
750 TABLET ORAL DAILY
Qty: 14 TABLET | Refills: 0 | Status: SHIPPED | OUTPATIENT
Start: 2020-07-08 | End: 2020-07-22

## 2020-07-08 RX ORDER — ONDANSETRON 4 MG/1
4 TABLET, FILM COATED ORAL EVERY 8 HOURS PRN
Qty: 15 TABLET | Refills: 0 | Status: SHIPPED | OUTPATIENT
Start: 2020-07-08 | End: 2020-07-13

## 2020-07-08 RX ORDER — METRONIDAZOLE 500 MG/1
500 TABLET ORAL 3 TIMES DAILY
Qty: 42 TABLET | Refills: 0 | Status: SHIPPED | OUTPATIENT
Start: 2020-07-08 | End: 2020-07-22

## 2020-07-08 NOTE — DIETARY NOTE
[address]     Admitting diagnosis:  Acute diverticulitis [K57.92]  Fever, unspecified fever cause [R50.9]    Ht: 172.7 cm (5' 8\")  Wt: 79.8 kg (175 lb 14.4 oz).  This is 114 % of IBW  Body mass index is 26.75

## 2020-07-08 NOTE — PROGRESS NOTES
[facility]  Progress Note    [facility] Patient Status:  Inpatient    1965 MRN [id number]   [facility] 5NW-A Attending Yaritza Noonan, DO   Hosp Day # 1 PCP Micky Luna MD     Subjective:    Patient tolerated clear liqu CT scan for 2 weeks, will f/u in office following CT scan  All questions answered  If tolerates diet, pain improved ok for dc home per primary service  DW Dr Maribel Mejia, [address] [address] Surgery  7/8/2020

## 2020-07-08 NOTE — PLAN OF CARE
Problem: Patient/Family Goals  Goal: Patient/Family Long Term Goal  Description  Patient's Long Term Goal: Discharge with adequate resources    Interventions:  - Participate in and comply with medical treatment plan  - See additional Care Plan goals for ordered  - Evaluate effectiveness of ordered antiemetic medications  - Provide nonpharmacologic comfort measures as appropriate  - Advance diet as tolerated, if ordered  - Obtain nutritional consult as needed  - Evaluate fluid balance  7/8/2020 1710 by Per

## 2020-07-08 NOTE — PROGRESS NOTES
NURSING DISCHARGE NOTE    Discharged Home via ambulatory  Accompanied by RN  Belongings Taken by patient/family. Pt assessed and ready for dc. Iv dc'd. Instructions given to pt. Dc home.

## 2020-07-08 NOTE — PLAN OF CARE
Pt AOx4. VSS on RA. Tele, NSR. Lovenox. Voids. Denies pain korey. Ambulates independently. Iv abx. Ivf. Clear liq diet, tolerating well. Pt and wife updated on poc, wctm.       Problem: Patient/Family Goals  Goal: Patient/Family Long Term Goal  Description  P as tolerated, if ordered  - Obtain nutritional consult as needed  - Evaluate fluid balance  Outcome: Progressing  Goal: Maintains or returns to baseline bowel function  Description  INTERVENTIONS:  - Assess bowel function  - Maintain adequate hydration wit

## 2020-07-08 NOTE — PROGRESS NOTES
[facility] Hospitalist Progress Note                                                                   [address]  7/1/1965    SUBJECTIVE:  Pt seen and examined.   Feels much better, denies ab HCl    Assessment/Plan:  Principal Problem:    Fever, unspecified fever cause  Active Problems:    Hypokalemia    Hyperglycemia    Diverticulitis    Acute diverticulitis    Fever and chills      50 yr old male with PMH sig for arthritis with HLA-B27 spondy

## 2020-07-08 NOTE — PLAN OF CARE
Problem: Diverticulitis   Data: pt is from home A7Ox4. O2 sats WNL on room air. , IS encouraged. On tele NSR. Lovenox. Electrolyte protocol. On clears for bowel rest. Voids. Up by self. Intervention: Iv fluids, ABx  Edu: pt updated on POC.     Problem: comfort measures as appropriate  - Advance diet as tolerated, if ordered  - Obtain nutritional consult as needed  - Evaluate fluid balance  Outcome: Progressing  Goal: Maintains or returns to baseline bowel function  Description  INTERVENTIONS:  - Assess b

## 2020-08-23 ENCOUNTER — APPOINTMENT (OUTPATIENT)
Dept: CT IMAGING | Facility: HOSPITAL | Age: 55
End: 2020-08-23
Attending: EMERGENCY MEDICINE
Payer: COMMERCIAL

## 2020-08-23 ENCOUNTER — HOSPITAL ENCOUNTER (EMERGENCY)
Facility: HOSPITAL | Age: 55
Discharge: HOME | End: 2020-08-23
Attending: EMERGENCY MEDICINE
Payer: COMMERCIAL

## 2020-08-23 VITALS
HEIGHT: 68 IN | WEIGHT: 165 LBS | OXYGEN SATURATION: 95 % | TEMPERATURE: 98 F | SYSTOLIC BLOOD PRESSURE: 156 MMHG | HEART RATE: 90 BPM | RESPIRATION RATE: 14 BRPM | BODY MASS INDEX: 25.01 KG/M2 | DIASTOLIC BLOOD PRESSURE: 96 MMHG

## 2020-08-23 DIAGNOSIS — N23 RENAL COLIC: Primary | ICD-10-CM

## 2020-08-23 LAB
ALBUMIN SERPL-MCNC: 3.8 G/DL (ref 3.4–5)
ALBUMIN/GLOB SERPL: 1.1 {RATIO} (ref 1–2)
ALP LIVER SERPL-CCNC: 60 U/L (ref 45–117)
ALT SERPL-CCNC: 40 U/L (ref 16–61)
ANION GAP SERPL CALC-SCNC: 4 MMOL/L (ref 0–18)
AST SERPL-CCNC: 20 U/L (ref 15–37)
BASOPHILS # BLD AUTO: 0.05 X10(3) UL (ref 0–0.2)
BASOPHILS NFR BLD AUTO: 0.4 %
BILIRUB SERPL-MCNC: 0.3 MG/DL (ref 0.1–2)
BILIRUB UR QL STRIP.AUTO: NEGATIVE
BUN BLD-MCNC: 21 MG/DL (ref 7–18)
BUN/CREAT SERPL: 18.8 (ref 10–20)
CALCIUM BLD-MCNC: 9.4 MG/DL (ref 8.5–10.1)
CHLORIDE SERPL-SCNC: 107 MMOL/L (ref 98–112)
CLARITY UR REFRACT.AUTO: CLEAR
CO2 SERPL-SCNC: 27 MMOL/L (ref 21–32)
COLOR UR AUTO: YELLOW
CREAT BLD-MCNC: 1.12 MG/DL (ref 0.7–1.3)
DEPRECATED RDW RBC AUTO: 43 FL (ref 35.1–46.3)
EOSINOPHIL # BLD AUTO: 0.07 X10(3) UL (ref 0–0.7)
EOSINOPHIL NFR BLD AUTO: 0.6 %
ERYTHROCYTE [DISTWIDTH] IN BLOOD BY AUTOMATED COUNT: 11.9 % (ref 11–15)
GLOBULIN PLAS-MCNC: 3.5 G/DL (ref 2.8–4.4)
GLUCOSE BLD-MCNC: 112 MG/DL (ref 70–99)
GLUCOSE UR STRIP.AUTO-MCNC: NEGATIVE MG/DL
HCT VFR BLD AUTO: 46.4 % (ref 39–53)
HGB BLD-MCNC: 14.7 G/DL (ref 13–17.5)
IMM GRANULOCYTES # BLD AUTO: 0.1 X10(3) UL (ref 0–1)
IMM GRANULOCYTES NFR BLD: 0.9 %
KETONES UR STRIP.AUTO-MCNC: NEGATIVE MG/DL
LEUKOCYTE ESTERASE UR QL STRIP.AUTO: NEGATIVE
LYMPHOCYTES # BLD AUTO: 2.05 X10(3) UL (ref 1–4)
LYMPHOCYTES NFR BLD AUTO: 18.4 %
M PROTEIN MFR SERPL ELPH: 7.3 G/DL (ref 6.4–8.2)
MCH RBC QN AUTO: 30.5 PG (ref 26–34)
MCHC RBC AUTO-ENTMCNC: 31.7 G/DL (ref 31–37)
MCV RBC AUTO: 96.3 FL (ref 80–100)
MONOCYTES # BLD AUTO: 0.7 X10(3) UL (ref 0.1–1)
MONOCYTES NFR BLD AUTO: 6.3 %
NEUTROPHILS # BLD AUTO: 8.15 X10 (3) UL (ref 1.5–7.7)
NEUTROPHILS # BLD AUTO: 8.15 X10(3) UL (ref 1.5–7.7)
NEUTROPHILS NFR BLD AUTO: 73.4 %
NITRITE UR QL STRIP.AUTO: NEGATIVE
OSMOLALITY SERPL CALC.SUM OF ELEC: 290 MOSM/KG (ref 275–295)
PH UR STRIP.AUTO: 6 [PH] (ref 4.5–8)
PLATELET # BLD AUTO: 342 10(3)UL (ref 150–450)
POTASSIUM SERPL-SCNC: 4.1 MMOL/L (ref 3.5–5.1)
PROT UR STRIP.AUTO-MCNC: NEGATIVE MG/DL
RBC # BLD AUTO: 4.82 X10(6)UL (ref 4.3–5.7)
RBC #/AREA URNS AUTO: >10 /HPF
SODIUM SERPL-SCNC: 138 MMOL/L (ref 136–145)
SP GR UR STRIP.AUTO: 1.02 (ref 1–1.03)
UROBILINOGEN UR STRIP.AUTO-MCNC: <2 MG/DL
WBC # BLD AUTO: 11.1 X10(3) UL (ref 4–11)

## 2020-08-23 PROCEDURE — 96375 TX/PRO/DX INJ NEW DRUG ADDON: CPT

## 2020-08-23 PROCEDURE — 80053 COMPREHEN METABOLIC PANEL: CPT | Performed by: EMERGENCY MEDICINE

## 2020-08-23 PROCEDURE — 81001 URINALYSIS AUTO W/SCOPE: CPT | Performed by: EMERGENCY MEDICINE

## 2020-08-23 PROCEDURE — 96374 THER/PROPH/DIAG INJ IV PUSH: CPT

## 2020-08-23 PROCEDURE — 99285 EMERGENCY DEPT VISIT HI MDM: CPT

## 2020-08-23 PROCEDURE — 85025 COMPLETE CBC W/AUTO DIFF WBC: CPT | Performed by: EMERGENCY MEDICINE

## 2020-08-23 PROCEDURE — 74176 CT ABD & PELVIS W/O CONTRAST: CPT | Performed by: EMERGENCY MEDICINE

## 2020-08-23 PROCEDURE — 99284 EMERGENCY DEPT VISIT MOD MDM: CPT

## 2020-08-23 PROCEDURE — 96361 HYDRATE IV INFUSION ADD-ON: CPT

## 2020-08-23 RX ORDER — HYDROCODONE BITARTRATE AND ACETAMINOPHEN 5; 325 MG/1; MG/1
1-2 TABLET ORAL EVERY 6 HOURS PRN
Qty: 10 TABLET | Refills: 0 | Status: SHIPPED | OUTPATIENT
Start: 2020-08-23 | End: 2020-08-30

## 2020-08-23 RX ORDER — KETOROLAC TROMETHAMINE 30 MG/ML
15 INJECTION, SOLUTION INTRAMUSCULAR; INTRAVENOUS ONCE
Status: COMPLETED | OUTPATIENT
Start: 2020-08-23 | End: 2020-08-23

## 2020-08-23 RX ORDER — MORPHINE SULFATE 4 MG/ML
4 INJECTION, SOLUTION INTRAMUSCULAR; INTRAVENOUS EVERY 30 MIN PRN
Status: DISCONTINUED | OUTPATIENT
Start: 2020-08-23 | End: 2020-08-23

## 2020-08-23 RX ORDER — ONDANSETRON 2 MG/ML
4 INJECTION INTRAMUSCULAR; INTRAVENOUS ONCE
Status: COMPLETED | OUTPATIENT
Start: 2020-08-23 | End: 2020-08-23

## 2020-08-23 NOTE — ED PROVIDER NOTES
Patient Seen in: [facility] Emergency Department      History   Patient presents with:  Abdomen/Flank Pain    Stated Complaint: abd pain    HPI    Rosamaria Purvis is a pleasant 53-year-old male presenting with abdominal pain.   This is pain that came on acute Pulse 88   Temp 97.9 °F (36.6 °C) (Temporal)   Resp 14   Ht 172.7 cm (5' 8\")   Wt 74.8 kg   SpO2 97%   BMI 25.09 kg/m²         Physical Exam    Alert and oriented patient appears uncomfortable HEENT exam is normal lungs are clear cardiovascular exam show pattern on CT scan of the sigmoid colon. In the meantime he is pain-free for possible passed kidney stone. No other signs of acute intra-abdominal process or perforation.   Patient was referred to urology was placed on pain medication for residual discomf

## 2020-08-24 NOTE — ED NOTES
Assumed care of patient. Pt is on stretcher, in gown, on monitor. Pt is waiting for lab results and ct scan. Pt is aox3; wife bedside.

## 2020-09-03 ENCOUNTER — HOSPITAL ENCOUNTER (OUTPATIENT)
Facility: HOSPITAL | Age: 55
Setting detail: OBSERVATION
Discharge: HOME | End: 2020-09-06
Attending: EMERGENCY MEDICINE | Admitting: INTERNAL MEDICINE
Payer: COMMERCIAL

## 2020-09-03 ENCOUNTER — APPOINTMENT (OUTPATIENT)
Dept: CT IMAGING | Facility: HOSPITAL | Age: 55
End: 2020-09-03
Attending: EMERGENCY MEDICINE
Payer: COMMERCIAL

## 2020-09-03 ENCOUNTER — APPOINTMENT (OUTPATIENT)
Dept: GENERAL RADIOLOGY | Facility: HOSPITAL | Age: 55
End: 2020-09-03
Attending: EMERGENCY MEDICINE
Payer: COMMERCIAL

## 2020-09-03 DIAGNOSIS — K57.92 ACUTE DIVERTICULITIS: ICD-10-CM

## 2020-09-03 DIAGNOSIS — R10.9 ABDOMINAL PAIN, ACUTE: Primary | ICD-10-CM

## 2020-09-03 PROCEDURE — 85025 COMPLETE CBC W/AUTO DIFF WBC: CPT | Performed by: EMERGENCY MEDICINE

## 2020-09-03 PROCEDURE — 84484 ASSAY OF TROPONIN QUANT: CPT | Performed by: EMERGENCY MEDICINE

## 2020-09-03 PROCEDURE — 99285 EMERGENCY DEPT VISIT HI MDM: CPT

## 2020-09-03 PROCEDURE — 93005 ELECTROCARDIOGRAM TRACING: CPT

## 2020-09-03 PROCEDURE — 71045 X-RAY EXAM CHEST 1 VIEW: CPT | Performed by: EMERGENCY MEDICINE

## 2020-09-03 PROCEDURE — 80053 COMPREHEN METABOLIC PANEL: CPT | Performed by: EMERGENCY MEDICINE

## 2020-09-03 PROCEDURE — 81001 URINALYSIS AUTO W/SCOPE: CPT | Performed by: EMERGENCY MEDICINE

## 2020-09-03 PROCEDURE — 74177 CT ABD & PELVIS W/CONTRAST: CPT | Performed by: EMERGENCY MEDICINE

## 2020-09-03 PROCEDURE — 96365 THER/PROPH/DIAG IV INF INIT: CPT

## 2020-09-03 PROCEDURE — 83690 ASSAY OF LIPASE: CPT | Performed by: EMERGENCY MEDICINE

## 2020-09-03 PROCEDURE — 93010 ELECTROCARDIOGRAM REPORT: CPT

## 2020-09-03 PROCEDURE — 85610 PROTHROMBIN TIME: CPT | Performed by: EMERGENCY MEDICINE

## 2020-09-03 PROCEDURE — 96375 TX/PRO/DX INJ NEW DRUG ADDON: CPT

## 2020-09-03 PROCEDURE — 85730 THROMBOPLASTIN TIME PARTIAL: CPT | Performed by: EMERGENCY MEDICINE

## 2020-09-03 PROCEDURE — 96361 HYDRATE IV INFUSION ADD-ON: CPT

## 2020-09-03 RX ORDER — METRONIDAZOLE 500 MG/1
500 TABLET ORAL ONCE
Status: COMPLETED | OUTPATIENT
Start: 2020-09-03 | End: 2020-09-03

## 2020-09-03 RX ORDER — METRONIDAZOLE 500 MG/1
500 TABLET ORAL 3 TIMES DAILY
Qty: 30 TABLET | Refills: 0 | Status: SHIPPED | OUTPATIENT
Start: 2020-09-03 | End: 2020-09-13

## 2020-09-03 RX ORDER — LEVOFLOXACIN 500 MG/1
500 TABLET, FILM COATED ORAL DAILY
Qty: 10 TABLET | Refills: 0 | Status: SHIPPED | OUTPATIENT
Start: 2020-09-03 | End: 2020-09-13

## 2020-09-03 RX ORDER — LEVOFLOXACIN 5 MG/ML
750 INJECTION, SOLUTION INTRAVENOUS ONCE
Status: COMPLETED | OUTPATIENT
Start: 2020-09-03 | End: 2020-09-04

## 2020-09-03 RX ORDER — CYCLOBENZAPRINE HCL 10 MG
10 TABLET ORAL DAILY
COMMUNITY

## 2020-09-04 PROBLEM — R10.9 ABDOMINAL PAIN, ACUTE: Status: ACTIVE | Noted: 2020-09-04

## 2020-09-04 PROBLEM — E87.1 HYPONATREMIA: Status: ACTIVE | Noted: 2020-09-04

## 2020-09-04 PROCEDURE — 83735 ASSAY OF MAGNESIUM: CPT | Performed by: HOSPITALIST

## 2020-09-04 PROCEDURE — 82728 ASSAY OF FERRITIN: CPT | Performed by: INTERNAL MEDICINE

## 2020-09-04 PROCEDURE — C9113 INJ PANTOPRAZOLE SODIUM, VIA: HCPCS | Performed by: EMERGENCY MEDICINE

## 2020-09-04 PROCEDURE — 85025 COMPLETE CBC W/AUTO DIFF WBC: CPT | Performed by: HOSPITALIST

## 2020-09-04 PROCEDURE — 96366 THER/PROPH/DIAG IV INF ADDON: CPT

## 2020-09-04 PROCEDURE — 96376 TX/PRO/DX INJ SAME DRUG ADON: CPT

## 2020-09-04 PROCEDURE — 84132 ASSAY OF SERUM POTASSIUM: CPT | Performed by: INTERNAL MEDICINE

## 2020-09-04 PROCEDURE — 80048 BASIC METABOLIC PNL TOTAL CA: CPT | Performed by: HOSPITALIST

## 2020-09-04 PROCEDURE — 87040 BLOOD CULTURE FOR BACTERIA: CPT | Performed by: INTERNAL MEDICINE

## 2020-09-04 PROCEDURE — 84145 PROCALCITONIN (PCT): CPT | Performed by: INTERNAL MEDICINE

## 2020-09-04 RX ORDER — METRONIDAZOLE 500 MG/100ML
500 INJECTION, SOLUTION INTRAVENOUS EVERY 8 HOURS
Status: DISCONTINUED | OUTPATIENT
Start: 2020-09-04 | End: 2020-09-06

## 2020-09-04 RX ORDER — SODIUM CHLORIDE 9 MG/ML
75 INJECTION, SOLUTION INTRAVENOUS CONTINUOUS
Status: DISCONTINUED | OUTPATIENT
Start: 2020-09-04 | End: 2020-09-04

## 2020-09-04 RX ORDER — HYDROMORPHONE HYDROCHLORIDE 1 MG/ML
0.5 INJECTION, SOLUTION INTRAMUSCULAR; INTRAVENOUS; SUBCUTANEOUS EVERY 30 MIN PRN
Status: ACTIVE | OUTPATIENT
Start: 2020-09-04 | End: 2020-09-04

## 2020-09-04 RX ORDER — HYDROCODONE BITARTRATE AND ACETAMINOPHEN 5; 325 MG/1; MG/1
1 TABLET ORAL EVERY 4 HOURS PRN
Status: DISCONTINUED | OUTPATIENT
Start: 2020-09-04 | End: 2020-09-06

## 2020-09-04 RX ORDER — POTASSIUM CHLORIDE 20 MEQ/1
40 TABLET, EXTENDED RELEASE ORAL EVERY 4 HOURS
Status: COMPLETED | OUTPATIENT
Start: 2020-09-04 | End: 2020-09-04

## 2020-09-04 RX ORDER — SODIUM CHLORIDE 9 MG/ML
INJECTION, SOLUTION INTRAVENOUS CONTINUOUS
Status: DISCONTINUED | OUTPATIENT
Start: 2020-09-04 | End: 2020-09-04

## 2020-09-04 RX ORDER — ONDANSETRON 2 MG/ML
4 INJECTION INTRAMUSCULAR; INTRAVENOUS ONCE
Status: COMPLETED | OUTPATIENT
Start: 2020-09-04 | End: 2020-09-04

## 2020-09-04 RX ORDER — LEVOFLOXACIN 5 MG/ML
750 INJECTION, SOLUTION INTRAVENOUS EVERY 24 HOURS
Status: DISCONTINUED | OUTPATIENT
Start: 2020-09-04 | End: 2020-09-06

## 2020-09-04 RX ORDER — ZOLPIDEM TARTRATE 5 MG/1
5 TABLET ORAL NIGHTLY PRN
Status: DISCONTINUED | OUTPATIENT
Start: 2020-09-04 | End: 2020-09-05

## 2020-09-04 RX ORDER — PREDNISONE 10 MG/1
10 TABLET ORAL DAILY
Status: DISCONTINUED | OUTPATIENT
Start: 2020-09-04 | End: 2020-09-05

## 2020-09-04 RX ORDER — ONDANSETRON 2 MG/ML
4 INJECTION INTRAMUSCULAR; INTRAVENOUS EVERY 4 HOURS PRN
Status: COMPLETED | OUTPATIENT
Start: 2020-09-04 | End: 2020-09-05

## 2020-09-04 RX ORDER — PANTOPRAZOLE SODIUM 40 MG/1
40 TABLET, DELAYED RELEASE ORAL
Status: DISCONTINUED | OUTPATIENT
Start: 2020-09-04 | End: 2020-09-06

## 2020-09-04 RX ORDER — OMEPRAZOLE 20 MG/1
20 CAPSULE, DELAYED RELEASE ORAL
COMMUNITY
End: 2021-03-30 | Stop reason: ALTCHOICE

## 2020-09-04 RX ORDER — SODIUM CHLORIDE 9 MG/ML
INJECTION, SOLUTION INTRAVENOUS CONTINUOUS
Status: DISCONTINUED | OUTPATIENT
Start: 2020-09-04 | End: 2020-09-05

## 2020-09-04 RX ORDER — ONDANSETRON 4 MG/1
4 TABLET, ORALLY DISINTEGRATING ORAL EVERY 4 HOURS PRN
Qty: 10 TABLET | Refills: 0 | Status: SHIPPED | OUTPATIENT
Start: 2020-09-04 | End: 2020-09-11

## 2020-09-04 RX ORDER — ACETAMINOPHEN 325 MG/1
650 TABLET ORAL EVERY 6 HOURS PRN
Status: DISCONTINUED | OUTPATIENT
Start: 2020-09-04 | End: 2020-09-06

## 2020-09-04 NOTE — ED PROVIDER NOTES
Patient Seen in: [facility] Emergency Department      History   Patient presents with:  Abdomen/Flank Pain    Stated Complaint: abdominal pain    HPI    82-year-old male presents emergency room with chief complaint of lower abdominal pain with nause HPI.  Constitutional and vital signs reviewed. All other systems reviewed and negative except as noted above.     Physical Exam     ED Triage Vitals [09/03/20 2104]   BP (!) 146/102   Pulse 93   Resp 20   Temp 99.1 °F (37.3 °C)   Temp src Temporal   Sp Abnormality         Status                     ---------                               -----------         ------                     CBC W/ DIFFERENTIAL[[id number]]          Abnormal            Final result                 Please daily for 10 days. Qty: 10 tablet Refills: 0    metRONIDAZOLE 500 MG Oral Tab  Take 1 tablet (500 mg total) by mouth 3 (three) times daily for 10 days.   Qty: 30 tablet Refills: 0

## 2020-09-04 NOTE — ED NOTES
Pt reports that he has had a horrible headache and that he had LLQ abdominal pain prior to arrival with nausea. Denies pain at this time.  States he was having a fever and that he took tylenol at [employer]

## 2020-09-04 NOTE — H&P
[facility] Hospitalist Team  History and Physical       Assessment/Plan:     #N/V   -suspect viral gastroenteritis  -covid test is pending, he does have positive contact in house (son)  -IVF, zofran prn  -CLD ordered    #Mild Abdominal pain/Abnormal imaging  -CT [address]   • SHOULDER SURG PROC UNLISTED Right 2015    right shoulder surgery        ALL:  No Known Allergies     ondansetron 4 MG Oral Tablet Dispersible, Take 1 tablet (4 mg total) by mouth every 4 (four) hours as needed for Nausea.   levoflox conjunctival pallor, EOMs intact. Nose: Nares normal,  Mucosa normal    Throat: Lips, mucosa, and tongue normal   Neck: Supple, symmetrical, trachea midline   Lungs:   Clear to auscultation bilaterally.  Normal effort   Chest wall:  No tenderness or defo includes the Dose Index Registry. PATIENT STATED HISTORY:(As transcribed by Technologist)  Patient presents with pelvic pain and nausea   CONTRAST USED:  100cc of Omnipaque 350  FINDINGS:  Lung bases demonstrate mild basilar atelectasis.   The liver, splee below the urinary bladder. 2D rendering are generated on the CT scanner workstation to localize potential stones in the cranio-caudal plane. Dose reduction techniques were used.  Dose information is transmitted to the [facility] [employer] of Radiology) N inflammatory stranding which may represent recently passed stone versus infectious etiology. Correlation with urinalysis is recommended. 3. Nonobstructing left renal calculus.     Dictated by (CST): Kalie Whitehead MD on 8/23/2020 at 7:45 PM     Finalized

## 2020-09-04 NOTE — PROGRESS NOTES
NURSING ADMISSION NOTE      Patient admitted via [facility] to room 501  Safety precautions initiated. Bed in low position. Call light in reach. Admission navigator completed. Alert and oriented x4. Room air. Voids per urinal. Up SBA.  C

## 2020-09-04 NOTE — ED INITIAL ASSESSMENT (HPI)
Patient presents to ED vomiting, low grade fever, left lower abdominal pain  Since 8pm. Patient states that his son was diagnosed with covid today. Patient states he had had covid test today however no results.  Medics states that in their 12 lead there was

## 2020-09-04 NOTE — PROGRESS NOTES
[facility] Pharmacy Note:  Renal Adjustment for levofloxacin ([place] Alas)    Erik Carson is a 50year old patient who has been prescribed levofloxacin (LEVAQUIN) 500 mg x1. CrCl is estimated creatinine clearance is 87 mL/min (based on SCr of 0.95 mg/dL).  so t

## 2020-09-04 NOTE — PLAN OF CARE
Problem: diverticulitis, R/O COVID    Data: Pt is AOx4. On room air, maintaining O2 sats >90%. Pt denies cough or feeling SOB. NSR/ST on telemetry. Pt with low grade temps, TMax 100.4, treated with PRN Tylenol.  Up with standby assist. Tolerating clear liqu

## 2020-09-04 NOTE — CONSULTS
[facility]  Report of Consultation    General Press Patient Status:  Observation    1965 MRN [id number]   [facility] 5NW-A Attending Jose Luis Cox, [address] Day # 0 PCP Holly Dockery MD     20    Reason for Consultation: never smoked. He has never used smokeless tobacco. He reports current alcohol use of about 1.0 standard drinks of alcohol per week. He reports that he does not use drugs.     Allergies:    No Known Allergies    Current Medications:      Current Facility-Adm Reported on 9/3/2020 ), Disp: 1 Device, Rfl: 0        Review of Systems:    10 point review performed pertinent positives and negatives per history of present illness.     Physical Exam:    Blood pressure (!) 139/95, pulse 97, temperature 98.9 °F (37.2 °C), 100cc of Omnipaque 350  FINDINGS:  Lung bases demonstrate mild basilar atelectasis. The liver, spleen, pancreas, adrenal glands and kidneys have an overall stable appearance. Probable parapelvic cyst noted of the left kidney measuring up to 3.5 x 2.1 cm. reduction techniques were used. Dose information is transmitted to the [facility] of Radiology) NRDR ([address]) which includes the Dose Index Registry.   PATIENT STATED HISTORY: (As transcribed by Technologist)  Patient co Nonobstructing left renal calculus.     Dictated by (CST): Kalie Whitehead MD on 8/23/2020 at 7:45 PM     Finalized by (CST): Kalie Whiteehad MD on 8/23/2020 at 7:54 PM       Xr Chest Ap Portable  (cpt=71045)    Result Date: 9/3/2020            PROCEDURE: etiology. No acute surgical management warranted at this time  Continue medical management.    DW Annmarie Wilks [address] Surgery  9/4/2020

## 2020-09-04 NOTE — ED NOTES
Pt provided urinal aware that he needs to provide a urine sample. Pt instructed to call if he needs assistance. Pt also instructed to call when he has provided a urine sample.  Call light within reach

## 2020-09-05 PROCEDURE — 96367 TX/PROPH/DG ADDL SEQ IV INF: CPT

## 2020-09-05 PROCEDURE — 81001 URINALYSIS AUTO W/SCOPE: CPT | Performed by: INTERNAL MEDICINE

## 2020-09-05 PROCEDURE — 96366 THER/PROPH/DIAG IV INF ADDON: CPT

## 2020-09-05 PROCEDURE — 86901 BLOOD TYPING SEROLOGIC RH(D): CPT | Performed by: INTERNAL MEDICINE

## 2020-09-05 PROCEDURE — 96376 TX/PRO/DX INJ SAME DRUG ADON: CPT

## 2020-09-05 PROCEDURE — 86850 RBC ANTIBODY SCREEN: CPT | Performed by: INTERNAL MEDICINE

## 2020-09-05 PROCEDURE — 86900 BLOOD TYPING SEROLOGIC ABO: CPT | Performed by: INTERNAL MEDICINE

## 2020-09-05 RX ORDER — HYDROCHLOROTHIAZIDE 25 MG/1
25 TABLET ORAL DAILY
Status: DISCONTINUED | OUTPATIENT
Start: 2020-09-06 | End: 2020-09-06

## 2020-09-05 RX ORDER — PREDNISONE 10 MG/1
10 TABLET ORAL
Status: DISCONTINUED | OUTPATIENT
Start: 2020-09-05 | End: 2020-09-06

## 2020-09-05 RX ORDER — HYDROCHLOROTHIAZIDE 12.5 MG/1
12.5 CAPSULE, GELATIN COATED ORAL DAILY
Status: DISCONTINUED | OUTPATIENT
Start: 2020-09-05 | End: 2020-09-05

## 2020-09-05 RX ORDER — ZOLPIDEM TARTRATE 10 MG/1
10 TABLET ORAL NIGHTLY PRN
Status: DISCONTINUED | OUTPATIENT
Start: 2020-09-05 | End: 2020-09-06

## 2020-09-05 NOTE — PROGRESS NOTES
PATIENT A&OX4.  DENIES SOB/CHEST PAIN, SATURATING IN THE MID 90'S ON ROOM AIR. NO COUGH, NO FEVER.  NSR ON TELEMETRY. REPORTS LOWER ABDOMINAL PAIN (2/10) AFTER EATING HIS LOW FIBER DIET. DENIES NAUSEA, NO VOMITING. NO STOOL.   LAST BOWEL MOVEMENT W

## 2020-09-05 NOTE — PLAN OF CARE
Problem: Patient/Family Goals  Goal: Patient/Family Long Term Goal  Description  Patient's Long Term Goal:   Discharge with appropriate resources    Interventions:  - comply with POC  - See additional Care Plan goals for specific interventions   Outcome:

## 2020-09-05 NOTE — PLAN OF CARE
Pt is AOx4. VSS, high DBP, MD aware. Afebrile for now. Last temp check 98.4, but per pt he feels \"feverish\". WCTM. PRN tylenol available. Icepacks offered. Maintaining O2 sats >90 on RA. Denies SOB/PEREZ or coughing. No pain at this georgi. Tele - ST.  Current appropriate  - Advance diet as tolerated, if ordered  - Obtain nutritional consult as needed  - Evaluate fluid balance  9/5/2020 1130 by Dale Diggs RN  Outcome: Progressing  9/5/2020 1125 by Dale Diggs RN  Outcome: Progressing

## 2020-09-05 NOTE — CONSULTS
INFECTIOUS DISEASE Rosina Rosado Patient Status:  Observation    1965 MRN [id number]   [facility] 5NW-A Attending Elmer Gonzalez, [address] [facility] Day # 0 PCP Agata Wong Known Allergies    Medications:    Current Facility-Administered Medications:   •  ondansetron HCl (ZOFRAN) injection 4 mg, 4 mg, Intravenous, Q4H PRN  •  zolpidem (AMBIEN) tab 5 mg, 5 mg, Oral, Nightly PRN  •  HYDROcodone-acetaminophen (NORCO) 5-325 MG pe No acute distress. Alert and oriented x 3. Vital signs: Temp:  [98.4 °F (36.9 °C)-100.4 °F (38 °C)] 98.5 °F (36.9 °C)  Pulse:  [] 92  Resp:  [16-22] 20  BP: (138-148)/() 148/94  HEENT: Moist mucous membranes. Extraocular muscles are intact.   Indira Mario risk for severe infection due to immunocompromised state on chronic prednisone  Good candidate for convalescent plasma with symptoms 6 days and immuncompormised  Patient agreable, and understands available through [facility]  No signs of hypoxia, no role for othe

## 2020-09-06 VITALS
RESPIRATION RATE: 18 BRPM | OXYGEN SATURATION: 98 % | HEIGHT: 68 IN | SYSTOLIC BLOOD PRESSURE: 127 MMHG | WEIGHT: 171.06 LBS | HEART RATE: 93 BPM | TEMPERATURE: 99 F | BODY MASS INDEX: 25.92 KG/M2 | DIASTOLIC BLOOD PRESSURE: 90 MMHG

## 2020-09-06 PROCEDURE — 82728 ASSAY OF FERRITIN: CPT | Performed by: INTERNAL MEDICINE

## 2020-09-06 PROCEDURE — 96372 THER/PROPH/DIAG INJ SC/IM: CPT

## 2020-09-06 PROCEDURE — 96366 THER/PROPH/DIAG IV INF ADDON: CPT

## 2020-09-06 PROCEDURE — 85379 FIBRIN DEGRADATION QUANT: CPT | Performed by: INTERNAL MEDICINE

## 2020-09-06 PROCEDURE — 86140 C-REACTIVE PROTEIN: CPT | Performed by: INTERNAL MEDICINE

## 2020-09-06 PROCEDURE — 30233L1 TRANSFUSION OF NONAUTOLOGOUS FRESH PLASMA INTO PERIPHERAL VEIN, PERCUTANEOUS APPROACH: ICD-10-PCS | Performed by: INTERNAL MEDICINE

## 2020-09-06 PROCEDURE — 96376 TX/PRO/DX INJ SAME DRUG ADON: CPT

## 2020-09-06 PROCEDURE — 36430 TRANSFUSION BLD/BLD COMPNT: CPT

## 2020-09-06 PROCEDURE — 85025 COMPLETE CBC W/AUTO DIFF WBC: CPT | Performed by: INTERNAL MEDICINE

## 2020-09-06 PROCEDURE — 83615 LACTATE (LD) (LDH) ENZYME: CPT | Performed by: INTERNAL MEDICINE

## 2020-09-06 PROCEDURE — 86927 PLASMA FRESH FROZEN: CPT

## 2020-09-06 RX ORDER — HYDROCHLOROTHIAZIDE 25 MG/1
25 TABLET ORAL DAILY
Qty: 30 TABLET | Refills: 0 | Status: SHIPPED | OUTPATIENT
Start: 2020-09-07 | End: 2021-03-30 | Stop reason: ALTCHOICE

## 2020-09-06 RX ORDER — ONDANSETRON 4 MG/1
4 TABLET, ORALLY DISINTEGRATING ORAL EVERY 6 HOURS PRN
Status: DISCONTINUED | OUTPATIENT
Start: 2020-09-06 | End: 2020-09-06

## 2020-09-06 RX ORDER — ONDANSETRON 2 MG/ML
4 INJECTION INTRAMUSCULAR; INTRAVENOUS EVERY 6 HOURS PRN
Status: DISCONTINUED | OUTPATIENT
Start: 2020-09-06 | End: 2020-09-06

## 2020-09-06 RX ORDER — ENOXAPARIN SODIUM 100 MG/ML
40 INJECTION SUBCUTANEOUS DAILY
Status: DISCONTINUED | OUTPATIENT
Start: 2020-09-06 | End: 2020-09-06

## 2020-09-06 NOTE — PROGRESS NOTES
[facility] Hospitalist Team  Progress Note      Peace   7/1/1965    Assessment/Plan:       #N/V - likely 2/2 covid  -covid test is +, he does have positive contact in house (son)  -IVF, zofran prn  -CLD ordered advance as tolerated    #Covid 19 infectio --  2.1  --    * 85  --        Recent Labs   Lab 09/03/20  2104 09/06/20  0631   ALT 31  --    AST 17  --    ALB 3.9  --    LDH  --  158       No results for input(s): PGLU in the last 168 hours.     Meds:   Scheduled:   • predniSONE  10 mg Oral Didi

## 2020-09-06 NOTE — PROGRESS NOTES
Pt. completed CCP. No reactions noted. Pt tolerated well. Only c/o nausea. Zofran given for relief. Hoping to discharge today if possible.

## 2020-09-06 NOTE — PLAN OF CARE
Problem: Patient/Family Goals  Goal: Patient/Family Long Term Goal  Description  Patient's Long Term Goal:   Discharge with appropriate resources    Interventions:  - comply with POC  - See additional Care Plan goals for specific interventions   Outcome: Angina  - Evaluate fluid balance, assess for edema, trend weights  Outcome: Progressing     Problem: RESPIRATORY - ADULT  Goal: Achieves optimal ventilation and oxygenation  Description  INTERVENTIONS:  - Assess for changes in respiratory status  - Assess

## 2020-09-06 NOTE — PROGRESS NOTES
[facility]                INFECTIOUS DISEASE PROGRESS NOTE    Sybil Pittman Patient Status:  Observation    1965 MRN [id number]   [facility] 5NW-A Attending Meagan Rangel, [address] [facility] Day # 0 PCP Praveen Cordero MD     Antib TP 7.6  --   --        No results found for: [facility] Encounter on 09/03/20   1.  BLOOD CULTURE     Status: None (Preliminary result)    Collection Time: 09/04/20  4:22 PM   Result Value Ref Range    Blood Culture Result No Growth

## 2020-09-06 NOTE — PLAN OF CARE
Pt A0x4. VSS, afebrile. Maintaining O2 sats>90% on RA. Denies any pain. Tele- NSR. Pt c/o severe nausea. Zofran PRN given. No abdominal pain or tenderness. Continent of bowel and bladder. Bathroom privileges. Last BM 9/5. Ambulating independently.  Safety p puncture sites for bleeding and/or hematoma  - Assess quality of pulses, skin color and temperature  - Assess for signs of decreased coronary artery perfusion - ex.  Angina  - Evaluate fluid balance, assess for edema, trend weights  Outcome: Progressing

## 2020-09-07 NOTE — PROGRESS NOTES
NURSING DISCHARGE NOTE    Discharged Home via Wheelchair. Accompanied by Support staff  Belongings Taken by patient/family. PIV removed. AVS reviewed with patient, all questions/concerns addressed.

## 2020-09-09 NOTE — DISCHARGE SUMMARY
General Medicine Discharge Summary     Patient ID:  Donna Duenas  50year old  7/1/1965    Admit date: 9/3/2020    Discharge date and time: 9/6/2020  8:27 PM     Attending Physician: Fuentes Martinez medications    hydrochlorothiazide 25 MG Oral Tab  Take 1 tablet (25 mg total) by mouth daily. , Normal, Disp-30 tablet, R-0    ondansetron 4 MG Oral Tablet Dispersible  Take 1 tablet (4 mg total) by mouth every 4 (four) hours as needed for Nausea., Normal, focal deficits    Total time coordinating care for discharge: Greater than 30 minutes    Guss Kawasaki, DO  [facility] Hospitalist  [phone]

## 2020-09-22 PROCEDURE — 88305 TISSUE EXAM BY PATHOLOGIST: CPT | Performed by: INTERNAL MEDICINE

## 2021-01-11 RX ORDER — DIAZEPAM 5 MG/1
TABLET ORAL
Qty: 2 TABLET | Refills: 0 | OUTPATIENT
Start: 2021-01-11

## 2021-05-06 PROBLEM — I10 ESSENTIAL HYPERTENSION: Status: ACTIVE | Noted: 2021-05-06

## 2021-05-07 PROBLEM — I10 ESSENTIAL HYPERTENSION: Status: ACTIVE | Noted: 2020-11-24

## 2021-05-18 ENCOUNTER — OFFICE VISIT (OUTPATIENT)
Dept: CARDIOLOGY | Age: 56
End: 2021-05-18

## 2021-05-18 VITALS
HEIGHT: 68 IN | DIASTOLIC BLOOD PRESSURE: 70 MMHG | BODY MASS INDEX: 26.67 KG/M2 | HEART RATE: 80 BPM | WEIGHT: 176 LBS | SYSTOLIC BLOOD PRESSURE: 120 MMHG

## 2021-05-18 DIAGNOSIS — I10 ESSENTIAL HYPERTENSION: Primary | ICD-10-CM

## 2021-05-18 DIAGNOSIS — E78.2 MIXED HYPERLIPIDEMIA: ICD-10-CM

## 2021-05-18 DIAGNOSIS — M47.819 SPONDYLOARTHROPATHY: ICD-10-CM

## 2021-05-18 PROBLEM — M47.812 OSTEOARTHRITIS OF CERVICAL SPINE: Status: ACTIVE | Noted: 2021-05-18

## 2021-05-18 PROBLEM — M47.812 OSTEOARTHRITIS OF CERVICAL SPINE: Status: RESOLVED | Noted: 2021-05-18 | Resolved: 2021-05-18

## 2021-05-18 PROCEDURE — 93000 ELECTROCARDIOGRAM COMPLETE: CPT | Performed by: INTERNAL MEDICINE

## 2021-05-18 PROCEDURE — 3078F DIAST BP <80 MM HG: CPT | Performed by: INTERNAL MEDICINE

## 2021-05-18 PROCEDURE — 99245 OFF/OP CONSLTJ NEW/EST HI 55: CPT | Performed by: INTERNAL MEDICINE

## 2021-05-18 PROCEDURE — 3074F SYST BP LT 130 MM HG: CPT | Performed by: INTERNAL MEDICINE

## 2021-05-18 RX ORDER — HYDROCODONE BITARTRATE AND ACETAMINOPHEN 5; 325 MG/1; MG/1
1 TABLET ORAL PRN
COMMUNITY
Start: 2017-04-27

## 2021-05-18 RX ORDER — ATORVASTATIN CALCIUM 40 MG/1
40 TABLET, FILM COATED ORAL DAILY
Qty: 90 TABLET | Refills: 3 | Status: SHIPPED | OUTPATIENT
Start: 2021-05-18

## 2021-05-18 RX ORDER — ZOLPIDEM TARTRATE 10 MG/1
5 TABLET ORAL
COMMUNITY
Start: 2019-06-13

## 2021-05-18 RX ORDER — CYCLOBENZAPRINE HCL 5 MG
10 TABLET ORAL DAILY
COMMUNITY
Start: 2021-05-17

## 2021-05-18 RX ORDER — LISINOPRIL AND HYDROCHLOROTHIAZIDE 12.5; 1 MG/1; MG/1
1 TABLET ORAL
COMMUNITY
Start: 2021-05-06

## 2021-05-18 SDOH — HEALTH STABILITY: PHYSICAL HEALTH: ON AVERAGE, HOW MANY DAYS PER WEEK DO YOU ENGAGE IN MODERATE TO STRENUOUS EXERCISE (LIKE A BRISK WALK)?: 4 DAYS

## 2021-05-18 ASSESSMENT — PATIENT HEALTH QUESTIONNAIRE - PHQ9
SUM OF ALL RESPONSES TO PHQ9 QUESTIONS 1 AND 2: 0
SUM OF ALL RESPONSES TO PHQ9 QUESTIONS 1 AND 2: 0
CLINICAL INTERPRETATION OF PHQ9 SCORE: NO FURTHER SCREENING NEEDED
2. FEELING DOWN, DEPRESSED OR HOPELESS: NOT AT ALL
CLINICAL INTERPRETATION OF PHQ2 SCORE: NO FURTHER SCREENING NEEDED
1. LITTLE INTEREST OR PLEASURE IN DOING THINGS: NOT AT ALL

## 2021-06-01 NOTE — ED INITIAL ASSESSMENT (HPI)
Anticoagulation: Return Visit        Pt here for routine f/u. Pt is on warfarin for PE, with INR goal of 2 - 3.    Current warfarin regimen: 5mg MWF and 2.5 mg daily row  Assessment  (-) missed or extra doses  (-) change in medications  (-) change in vitamin K intake  (-) EtOH use  (-) signs/sx of VTE or stroke  (-) new bleeding/bruising  (-) recent falls  (-) upcoming surgeries/procedures    Plan   Reviewed the safe use of warfarin, factors that affect INR or bleeding risk, and the common signs and symptoms of a clot. Pt counseled to seek emergent care in case of excessive bleeding/bruising, or if sx of VTE/stroke occur. Patient's INR=3 today, which is within the desired therapeutic range of 2 - 3. Plan is to continue with the current warfarin regimen.. RTC in 4 weeks (6/28/21).  Progress note faxed to physician.    Given Rx this visit:      Pt c/o \"pain to the tip of my penis, it feels itchy so I put Lotrimin to it, going on for a week. \" Pt denies problems with testicles, denies any abnormal discharge, denies urinary sxs, denies fever

## 2021-06-25 ENCOUNTER — ANCILLARY PROCEDURE (OUTPATIENT)
Dept: CARDIOLOGY | Age: 56
End: 2021-06-25
Attending: INTERNAL MEDICINE

## 2021-06-25 ENCOUNTER — TELEPHONE (OUTPATIENT)
Dept: CARDIOLOGY | Age: 56
End: 2021-06-25

## 2021-06-25 DIAGNOSIS — E78.2 MIXED HYPERLIPIDEMIA: ICD-10-CM

## 2021-06-25 DIAGNOSIS — I10 ESSENTIAL HYPERTENSION: ICD-10-CM

## 2021-06-25 DIAGNOSIS — M47.819 SPONDYLOARTHROPATHY: ICD-10-CM

## 2021-06-25 PROCEDURE — 93306 TTE W/DOPPLER COMPLETE: CPT | Performed by: INTERNAL MEDICINE

## 2021-06-25 PROCEDURE — 93224 XTRNL ECG REC UP TO 48 HRS: CPT | Performed by: INTERNAL MEDICINE

## 2021-07-06 ENCOUNTER — TELEPHONE (OUTPATIENT)
Dept: CARDIOLOGY | Age: 56
End: 2021-07-06

## 2024-01-13 NOTE — TELEPHONE ENCOUNTER
If the wait time is too long then I recommend he go to [facility] [place]. I do not have any specific therapists who work that that I would recommend, but I have had some success with patients who go there.
Pt states Dr Patricia Larson wrote down two names - Keaton Monteiro and Ez Sanchez - but both are booked for weeks. Is it okay to see different therapist? Please call.
Relayed Dr. Parisi Shelby recommendation to patient. Patient grateful. Nothing further at this time.
Spoke to patient and he is in pain and was wondering if he could schedule with a different therapist while waiting for the therapists Dr. Ryan Sauceda is suggesting.    Was told to schedule with the once Dr. Ryan Sauceda was suggesting and told to be put on a waitlis
show

## 2024-12-19 NOTE — PROGRESS NOTES
Location of Pain:     Date Pain Began: 2012          Work Related:   No        Receiving Work Comp/Disability:   No    Numeric Rating Scale:  Pain at Present:  3-4
No

## (undated) NOTE — MR AVS SNAPSHOT
[facility], [place] [id number] [facility], [address] [place] [address] [phone]               Thank you for choosing us for your health care visit with Syeda Ventura MD.  We are glad to serve you and happy to provide you with this at [phone]. Thursday April 13, 2017     Imaging:  XR CERVICAL SPINE COMPLETE W/FLEX + EXT (CPT=72052)    Instructions: To schedule an appointment for your radiology test please call Nan [facility] Scheduling at [phone].          Re been approved by your insurer. Depending on your insurance carrier, approval may take 3-10 days. It is highly recommended patients contact their insurance carrier directly to determine coverage.   If test is done without insurance authorization, patient ma medical emergencies, dial 911. Visit [email or web address] to learn more. Educational Information     Your blood pressure indicates you may be at-risk for Hypertension. Please consider the following Lifestyle Modifications.   Also, please

## (undated) NOTE — ED AVS SNAPSHOT
Andreea Hernández   MRN: [id number]    Department:  [facility] Emergency Department   Date of Visit:  11/24/2019           Disclosure     Insurance plans vary and the physician(s) referred by the ER may not be covered by your plan.  Please contact y tell this physician (or your personal doctor if your instructions are to return to your personal doctor) about any new or lasting problems. The primary care or specialist physician will see patients referred from the [facility] Emergency Department.  Cierra Tamayo

## (undated) NOTE — MR AVS SNAPSHOT
[address], [address] [place] [address] [phone]               Thank you for choosing us for your health care visit with Analilia Beckford MD.  We are glad to serve you and happy to provide you with this Spondylosis of cervical region without myelopathy or radiculopathy    Cervicalgia          Instructions and Information about Your Health      Refill policies:    ? Allow 2-3 business days for refills; controlled substances may take longer. ?  Contact you without insurance authorization, patient may be responsible for the entire amount billed. Precertification and Prior Authorizations  If your physician has recommended that you have a procedure or additional testing performed.   Kareem

## (undated) NOTE — LETTER
AUTHORIZATION FOR SURGICAL OPERATION OR OTHER PROCEDURE    1.  I hereby authorize Dr. Tila Orellana and the [facility] staff assigned to my case to perform the following operation and/or procedure at the [facility]:    Bilateral cervical and upper trapezial Relationship to Patient:           []  Parent    Responsible person                          []  Spouse  In case of minor or                    [] Other  _____________   Incompetent name:  __________________________________________________